# Patient Record
Sex: MALE | Race: WHITE | Employment: OTHER | URBAN - METROPOLITAN AREA
[De-identification: names, ages, dates, MRNs, and addresses within clinical notes are randomized per-mention and may not be internally consistent; named-entity substitution may affect disease eponyms.]

---

## 2017-02-07 ENCOUNTER — TELEPHONE (OUTPATIENT)
Dept: FAMILY MEDICINE CLINIC | Facility: CLINIC | Age: 72
End: 2017-02-07

## 2017-02-07 NOTE — TELEPHONE ENCOUNTER
LMOM for pt to call back and schedule his Medicare Annual Well Visit w/Dr Peter Getting due after 6/10/17

## 2017-02-08 ENCOUNTER — HOSPITAL ENCOUNTER (EMERGENCY)
Facility: HOSPITAL | Age: 72
Discharge: HOME | End: 2017-02-08
Attending: EMERGENCY MEDICINE
Payer: MEDICARE

## 2017-02-08 ENCOUNTER — APPOINTMENT (OUTPATIENT)
Dept: CT IMAGING | Facility: HOSPITAL | Age: 72
End: 2017-02-08
Attending: EMERGENCY MEDICINE
Payer: MEDICARE

## 2017-02-08 VITALS
WEIGHT: 185 LBS | DIASTOLIC BLOOD PRESSURE: 67 MMHG | HEIGHT: 70 IN | HEART RATE: 73 BPM | OXYGEN SATURATION: 99 % | RESPIRATION RATE: 16 BRPM | BODY MASS INDEX: 26.48 KG/M2 | TEMPERATURE: 98 F | SYSTOLIC BLOOD PRESSURE: 111 MMHG

## 2017-02-08 DIAGNOSIS — K57.92 ACUTE DIVERTICULITIS: Primary | ICD-10-CM

## 2017-02-08 LAB
ALBUMIN SERPL-MCNC: 4 G/DL (ref 3.5–4.8)
ALP LIVER SERPL-CCNC: 81 U/L (ref 45–117)
ALT SERPL-CCNC: 26 U/L (ref 17–63)
AST SERPL-CCNC: 18 U/L (ref 15–41)
BASOPHILS # BLD AUTO: 0.06 X10(3) UL (ref 0–0.1)
BASOPHILS NFR BLD AUTO: 0.4 %
BILIRUB SERPL-MCNC: 0.8 MG/DL (ref 0.1–2)
BILIRUB UR QL STRIP.AUTO: NEGATIVE
BUN BLD-MCNC: 18 MG/DL (ref 8–20)
CALCIUM BLD-MCNC: 9 MG/DL (ref 8.3–10.3)
CHLORIDE: 104 MMOL/L (ref 101–111)
CLARITY UR REFRACT.AUTO: CLEAR
CO2: 28 MMOL/L (ref 22–32)
CREAT BLD-MCNC: 1.06 MG/DL (ref 0.7–1.3)
EOSINOPHIL # BLD AUTO: 0.18 X10(3) UL (ref 0–0.3)
EOSINOPHIL NFR BLD AUTO: 1.3 %
ERYTHROCYTE [DISTWIDTH] IN BLOOD BY AUTOMATED COUNT: 12.1 % (ref 11.5–16)
GLUCOSE BLD-MCNC: 122 MG/DL (ref 70–99)
GLUCOSE UR STRIP.AUTO-MCNC: NEGATIVE MG/DL
HCT VFR BLD AUTO: 44.6 % (ref 37–53)
HGB BLD-MCNC: 15.8 G/DL (ref 13–17)
IMMATURE GRANULOCYTE COUNT: 0.07 X10(3) UL (ref 0–1)
IMMATURE GRANULOCYTE RATIO %: 0.5 %
KETONES UR STRIP.AUTO-MCNC: NEGATIVE MG/DL
LEUKOCYTE ESTERASE UR QL STRIP.AUTO: NEGATIVE
LIPASE: 174 U/L (ref 73–393)
LYMPHOCYTES # BLD AUTO: 1.5 X10(3) UL (ref 0.9–4)
LYMPHOCYTES NFR BLD AUTO: 10.9 %
M PROTEIN MFR SERPL ELPH: 7.3 G/DL (ref 6.1–8.3)
MCH RBC QN AUTO: 32.2 PG (ref 27–33.2)
MCHC RBC AUTO-ENTMCNC: 35.4 G/DL (ref 31–37)
MCV RBC AUTO: 90.8 FL (ref 80–99)
MONOCYTES # BLD AUTO: 1.39 X10(3) UL (ref 0.1–0.6)
MONOCYTES NFR BLD AUTO: 10.1 %
NEUTROPHIL ABS PRELIM: 10.53 X10 (3) UL (ref 1.3–6.7)
NEUTROPHILS # BLD AUTO: 10.53 X10(3) UL (ref 1.3–6.7)
NEUTROPHILS NFR BLD AUTO: 76.8 %
NITRITE UR QL STRIP.AUTO: NEGATIVE
PH UR STRIP.AUTO: 6 [PH] (ref 4.5–8)
PLATELET # BLD AUTO: 203 10(3)UL (ref 150–450)
POTASSIUM SERPL-SCNC: 4.2 MMOL/L (ref 3.6–5.1)
PROT UR STRIP.AUTO-MCNC: NEGATIVE MG/DL
RBC # BLD AUTO: 4.91 X10(6)UL (ref 3.8–5.8)
RBC UR QL AUTO: NEGATIVE
RED CELL DISTRIBUTION WIDTH-SD: 40.2 FL (ref 35.1–46.3)
SODIUM SERPL-SCNC: 138 MMOL/L (ref 136–144)
SP GR UR STRIP.AUTO: 1.01 (ref 1–1.03)
UROBILINOGEN UR STRIP.AUTO-MCNC: <2 MG/DL
WBC # BLD AUTO: 13.7 X10(3) UL (ref 4–13)

## 2017-02-08 PROCEDURE — 96367 TX/PROPH/DG ADDL SEQ IV INF: CPT

## 2017-02-08 PROCEDURE — 99284 EMERGENCY DEPT VISIT MOD MDM: CPT

## 2017-02-08 PROCEDURE — 83690 ASSAY OF LIPASE: CPT | Performed by: EMERGENCY MEDICINE

## 2017-02-08 PROCEDURE — 74176 CT ABD & PELVIS W/O CONTRAST: CPT

## 2017-02-08 PROCEDURE — 85025 COMPLETE CBC W/AUTO DIFF WBC: CPT | Performed by: EMERGENCY MEDICINE

## 2017-02-08 PROCEDURE — 80053 COMPREHEN METABOLIC PANEL: CPT | Performed by: EMERGENCY MEDICINE

## 2017-02-08 PROCEDURE — 96365 THER/PROPH/DIAG IV INF INIT: CPT

## 2017-02-08 PROCEDURE — 96361 HYDRATE IV INFUSION ADD-ON: CPT

## 2017-02-08 PROCEDURE — 96375 TX/PRO/DX INJ NEW DRUG ADDON: CPT

## 2017-02-08 PROCEDURE — 81003 URINALYSIS AUTO W/O SCOPE: CPT | Performed by: EMERGENCY MEDICINE

## 2017-02-08 PROCEDURE — 96366 THER/PROPH/DIAG IV INF ADDON: CPT

## 2017-02-08 RX ORDER — KETOROLAC TROMETHAMINE 30 MG/ML
30 INJECTION, SOLUTION INTRAMUSCULAR; INTRAVENOUS ONCE
Status: COMPLETED | OUTPATIENT
Start: 2017-02-08 | End: 2017-02-08

## 2017-02-08 RX ORDER — METRONIDAZOLE 500 MG/100ML
500 INJECTION, SOLUTION INTRAVENOUS ONCE
Status: COMPLETED | OUTPATIENT
Start: 2017-02-08 | End: 2017-02-08

## 2017-02-08 RX ORDER — LEVOFLOXACIN 500 MG/1
500 TABLET, FILM COATED ORAL DAILY
Qty: 10 TABLET | Refills: 0 | Status: SHIPPED | OUTPATIENT
Start: 2017-02-08 | End: 2017-02-18

## 2017-02-08 RX ORDER — SODIUM CHLORIDE 9 MG/ML
INJECTION, SOLUTION INTRAVENOUS ONCE
Status: COMPLETED | OUTPATIENT
Start: 2017-02-08 | End: 2017-02-08

## 2017-02-08 RX ORDER — LEVOFLOXACIN 5 MG/ML
750 INJECTION, SOLUTION INTRAVENOUS ONCE
Status: COMPLETED | OUTPATIENT
Start: 2017-02-08 | End: 2017-02-08

## 2017-02-08 RX ORDER — METRONIDAZOLE 500 MG/1
500 TABLET ORAL 2 TIMES DAILY
Qty: 20 TABLET | Refills: 0 | Status: SHIPPED | OUTPATIENT
Start: 2017-02-08 | End: 2017-02-18

## 2017-02-08 RX ORDER — ONDANSETRON 8 MG/1
8 TABLET, ORALLY DISINTEGRATING ORAL EVERY 6 HOURS PRN
Qty: 10 TABLET | Refills: 0 | Status: SHIPPED | OUTPATIENT
Start: 2017-02-08 | End: 2017-02-13

## 2017-02-08 RX ORDER — TAMSULOSIN HYDROCHLORIDE 0.4 MG/1
0.4 CAPSULE ORAL DAILY
COMMUNITY
End: 2017-02-13

## 2017-02-08 NOTE — PROGRESS NOTES
Pharmacy Note: dose adjustment of levaquin    Eloisa Murry is a 80year old male who has been prescribed Levaquin 500 mg IV x 1 dose in the ER.   CrCl is 67 ml/min so the dose has been adjusted  to Levaquin 750 mg IV x 1 in the ER per hospital renal

## 2017-02-08 NOTE — ED INITIAL ASSESSMENT (HPI)
C/o abd pain, more to the lower quads, onset 24hrs PTA. Pt denies n/v/d, reports urinary urgency, \"not a lot coming out. \" , denies hematuria

## 2017-02-08 NOTE — ED PROVIDER NOTES
Patient Seen in: [facility] Emergency Department    History   Patient presents with:  Abdomen/Flank Pain (GI/)    Stated Complaint: abd pain    HPI    Patient complains of abdominal pain. Abdominal pain started yesterday.   Patient notes that he h Capsule Delayed Release,  Take 1 capsule (40 mg total) by mouth daily. Patient taking differently: Take 40 mg by mouth every other day. Acidophilus/Pectin (PROBIOTIC) Oral Cap,  Take 1 capsule by mouth daily.    EQ FIBER SUPPLEMENT OR,  Take 6 capsules bilaterally. No rhonchi or rales. Heart: Normal S1 and S2, without murmur or rub. Distal pulses are strong and symmetric. Abdomen: Soft, nondistended. Bowel sounds present. Tenderness across the central pelvis.   No tenderness at McBurney's and patien shift on differential.  Metabolic panel unremarkable  Urinalysis shows negative blood, nitrites, leukocytes     CT abdomen shows acute diverticulitis of the sigmoid. This would be consistent with patient's symptoms. No urinary abnormalities noted.   Incid

## 2017-02-13 ENCOUNTER — OFFICE VISIT (OUTPATIENT)
Dept: FAMILY MEDICINE CLINIC | Facility: CLINIC | Age: 72
End: 2017-02-13

## 2017-02-13 VITALS
HEART RATE: 68 BPM | RESPIRATION RATE: 14 BRPM | DIASTOLIC BLOOD PRESSURE: 60 MMHG | SYSTOLIC BLOOD PRESSURE: 100 MMHG | WEIGHT: 185.63 LBS | TEMPERATURE: 98 F | HEIGHT: 69.9 IN | BODY MASS INDEX: 26.57 KG/M2

## 2017-02-13 DIAGNOSIS — K57.32 DIVERTICULITIS OF LARGE INTESTINE WITHOUT PERFORATION OR ABSCESS WITHOUT BLEEDING: Primary | ICD-10-CM

## 2017-02-13 DIAGNOSIS — K21.00 REFLUX ESOPHAGITIS: ICD-10-CM

## 2017-02-13 DIAGNOSIS — R73.9 ELEVATED BLOOD SUGAR: ICD-10-CM

## 2017-02-13 DIAGNOSIS — Z12.5 PROSTATE CANCER SCREENING: ICD-10-CM

## 2017-02-13 DIAGNOSIS — Z13.6 SCREENING FOR CARDIOVASCULAR CONDITION: ICD-10-CM

## 2017-02-13 DIAGNOSIS — I70.0 ABDOMINAL AORTIC ATHEROSCLEROSIS (HCC): ICD-10-CM

## 2017-02-13 PROBLEM — M75.80 ROTATOR CUFF TENDINITIS: Status: ACTIVE | Noted: 2017-02-13

## 2017-02-13 PROCEDURE — 99214 OFFICE O/P EST MOD 30 MIN: CPT | Performed by: FAMILY MEDICINE

## 2017-02-13 RX ORDER — OMEPRAZOLE 40 MG/1
40 CAPSULE, DELAYED RELEASE ORAL DAILY
Qty: 90 CAPSULE | Refills: 3 | Status: SHIPPED | OUTPATIENT
Start: 2017-02-13 | End: 2018-06-19

## 2017-02-13 RX ORDER — TAMSULOSIN HYDROCHLORIDE 0.4 MG/1
0.4 CAPSULE ORAL DAILY
Qty: 90 CAPSULE | Refills: 3 | Status: SHIPPED | OUTPATIENT
Start: 2017-02-13 | End: 2018-06-19

## 2017-02-13 NOTE — PROGRESS NOTES
Patient presents with:  ER F/U: went to ED on 2/8/17 for Diverticulits- pain still present  Medication Request: new pharmacy needs new 90 day scripts- Flomax and Omeprazole  Bloating: feeling alot more full in abdomen since beginning of year    HPI:   Anai - supple, no significant adenopathy  Chest - clear to auscultation, no wheezes, rales or rhonchi, symmetric air entry  Heart - normal rate, regular rhythm, normal S1, S2, no murmurs, rubs, clicks or gallops  Abdomen - soft, tender only just L of midline in mmol/L 138   Potassium      3.6-5.1 mmol/L 4.2   Chloride      101-111 mmol/L 104   Carbon Dioxide, Total      22.0-32.0 mmol/L 28.0   URINE-COLOR      Yellow Straw   APPEARANCE      Clear Clear   SPECIFIC GRAVITY      1.001-1.030 1.011   GLUCOSE (URINE DI

## 2017-06-06 ENCOUNTER — LAB ENCOUNTER (OUTPATIENT)
Dept: LAB | Age: 72
End: 2017-06-06
Attending: FAMILY MEDICINE
Payer: MEDICARE

## 2017-06-06 DIAGNOSIS — Z12.5 PROSTATE CANCER SCREENING: ICD-10-CM

## 2017-06-06 DIAGNOSIS — Z13.6 SCREENING FOR ISCHEMIC HEART DISEASE: Primary | ICD-10-CM

## 2017-06-06 PROCEDURE — 80053 COMPREHEN METABOLIC PANEL: CPT

## 2017-06-06 PROCEDURE — 80061 LIPID PANEL: CPT

## 2017-06-06 PROCEDURE — 36415 COLL VENOUS BLD VENIPUNCTURE: CPT

## 2017-06-07 ENCOUNTER — TELEPHONE (OUTPATIENT)
Dept: FAMILY MEDICINE CLINIC | Facility: CLINIC | Age: 72
End: 2017-06-07

## 2017-06-07 NOTE — TELEPHONE ENCOUNTER
LMOM for pt to either call back or come in 15 prior to answer Annual Health Assessment Form (form by daily checklist)

## 2017-06-13 ENCOUNTER — OFFICE VISIT (OUTPATIENT)
Dept: FAMILY MEDICINE CLINIC | Facility: CLINIC | Age: 72
End: 2017-06-13

## 2017-06-13 VITALS
SYSTOLIC BLOOD PRESSURE: 104 MMHG | HEIGHT: 69.75 IN | DIASTOLIC BLOOD PRESSURE: 62 MMHG | HEART RATE: 80 BPM | TEMPERATURE: 98 F | RESPIRATION RATE: 14 BRPM | WEIGHT: 180 LBS | BODY MASS INDEX: 26.06 KG/M2

## 2017-06-13 DIAGNOSIS — M17.12 ARTHRITIS OF LEFT KNEE: ICD-10-CM

## 2017-06-13 DIAGNOSIS — J30.2 SEASONAL ALLERGIC RHINITIS, UNSPECIFIED ALLERGIC RHINITIS TRIGGER: ICD-10-CM

## 2017-06-13 DIAGNOSIS — Z00.00 ENCOUNTER FOR ANNUAL HEALTH EXAMINATION: Primary | ICD-10-CM

## 2017-06-13 DIAGNOSIS — Z23 NEED FOR VACCINATION: ICD-10-CM

## 2017-06-13 DIAGNOSIS — Z96.641 STATUS POST RIGHT HIP REPLACEMENT: ICD-10-CM

## 2017-06-13 PROCEDURE — G0402 INITIAL PREVENTIVE EXAM: HCPCS | Performed by: FAMILY MEDICINE

## 2017-06-13 PROCEDURE — 90732 PPSV23 VACC 2 YRS+ SUBQ/IM: CPT | Performed by: FAMILY MEDICINE

## 2017-06-13 PROCEDURE — G0009 ADMIN PNEUMOCOCCAL VACCINE: HCPCS | Performed by: FAMILY MEDICINE

## 2017-06-13 NOTE — PATIENT INSTRUCTIONS
Clovis Garcia's SCREENING SCHEDULE   Tests on this list are recommended by your physician but may not be covered, or covered at this frequency, by your insurer. Please check with your insurance carrier before scheduling to verify coverage.     PREV years- more often if abnormal Colonoscopy,5 Years due on 07/01/2021 Update [facility] if applicable    Flex Sigmoidoscopy Screen  Covered every 5 years No results found for this or any previous visit. No flowsheet data found.      Fecal Occult Blood prescription benefits, but Medicare does not cover unless Medically needed    Zoster (Not covered by Medicare Part B) No orders found for this or any previous visit.  This may be covered with your pharmacy  prescription benefits     Recommended Websites for

## 2017-06-13 NOTE — PROGRESS NOTES
HPI:   Lamont Otero is a 80year old male who presents for a Medicare Initial Preventative Physical Exam (Welcome to Medicare- < 12 months on Medicare). Preventative Screening  Colonoscopy - 7/2016. Normal, repeat 5 yrs. Prostate - PSA good. as Taking for the 6/13/17 encounter (Office Visit) with Minda Magallon MD:  Omeprazole 40 MG Oral Capsule Delayed Release Take 1 capsule (40 mg total) by mouth daily.    tamsulosin HCl (FLOMAX) 0.4 MG Oral Cap Take 1 capsule (0.4 mg total) by mouth daily Temp(Src) 97.6 °F (36.4 °C)  Resp 14  Ht 69.75\"  Wt 180 lb  BMI 26.00 kg/m2  Estimated body mass index is 26 kg/(m^2) as calculated from the following:    Height as of this encounter: 69.75\". Weight as of this encounter: 180 lb.     Medicare Hearing As PRSV FREE SINGLE D (80211) FLU CLINIC 09/21/2016   • Influenza 01/14/2013   • Pneumococcal (Prevnar 13) 06/10/2016   • TD 07/17/2008   • TDAP 05/01/2016   • Zoster (Shingles) 09/01/2013       ASSESSMENT AND OTHER RELEVANT CHRONIC CONDITIONS:   Collin Edwards good energy level?: Stretching; Appropriate Exercise    How would you describe your daily physical activity?: Moderate    How would you describe your current health state?: Good    How do you maintain positive mental well-being?: Social Interaction;Games; Vi will?: No     Please go to \"Cognitive Assessment\" under Medicare Assessment section in Charting, test patient and document. Then, refresh your progress note to see your input here.   Cognitive Assessment     What day of the week is this?: Correct    Wh orders found for this or any previous visit. Tetanus No orders found for this or any previous visit.          SPECIFIC DISEASE MONITORING Internal Lab or Procedure External Lab or Procedure   Annual Monitoring of Persistent     Medications (ACE/ARB, dig

## 2017-07-26 ENCOUNTER — TELEPHONE (OUTPATIENT)
Dept: FAMILY MEDICINE CLINIC | Facility: CLINIC | Age: 72
End: 2017-07-26

## 2017-07-26 DIAGNOSIS — Z12.5 SCREENING FOR MALIGNANT NEOPLASM OF PROSTATE: ICD-10-CM

## 2017-07-26 DIAGNOSIS — Z13.6 SCREENING FOR CARDIOVASCULAR CONDITION: Primary | ICD-10-CM

## 2017-07-26 NOTE — TELEPHONE ENCOUNTER
Medicare Annual Well Visit on 6/19/18 and is requesting blood work prior to appt. Will you approve it?  If so enter into [facility] and notify pt

## 2017-07-26 NOTE — TELEPHONE ENCOUNTER
Called patient and left message that orders were entered and to wait until about a week before his appt to have blood drawn, also advised patient of fasting

## 2017-10-24 ENCOUNTER — PATIENT MESSAGE (OUTPATIENT)
Dept: FAMILY MEDICINE CLINIC | Facility: CLINIC | Age: 72
End: 2017-10-24

## 2017-10-24 NOTE — TELEPHONE ENCOUNTER
From: Tiffanie Mata  To: Radha Mccann MD  Sent: 10/24/2017 1:10 PM CDT  Subject: Non-Urgent Medical Question    I have been watching advertisements for the senior flu vaccine. Is this something I should be getting? Are there any negatives?

## 2017-10-26 ENCOUNTER — CHARTING TRANS (OUTPATIENT)
Dept: OTHER | Age: 72
End: 2017-10-26

## 2018-01-23 ENCOUNTER — TELEPHONE (OUTPATIENT)
Dept: FAMILY MEDICINE CLINIC | Facility: CLINIC | Age: 73
End: 2018-01-23

## 2018-01-23 NOTE — TELEPHONE ENCOUNTER
Pt has scheduled his Pre-Op w/Dr Christophe Warren on 2/5/18. Having L Knee Replacement Surgery at [facility] w/Dr Valencia Buchanan. OM for [facility] at [phone] to fax over orders

## 2018-02-05 ENCOUNTER — OFFICE VISIT (OUTPATIENT)
Dept: FAMILY MEDICINE CLINIC | Facility: CLINIC | Age: 73
End: 2018-02-05

## 2018-02-05 VITALS
RESPIRATION RATE: 14 BRPM | WEIGHT: 181.19 LBS | HEIGHT: 69.6 IN | DIASTOLIC BLOOD PRESSURE: 70 MMHG | HEART RATE: 64 BPM | BODY MASS INDEX: 26.23 KG/M2 | TEMPERATURE: 98 F | SYSTOLIC BLOOD PRESSURE: 120 MMHG

## 2018-02-05 DIAGNOSIS — Z01.818 PREOP EXAMINATION: Primary | ICD-10-CM

## 2018-02-05 DIAGNOSIS — K21.00 REFLUX ESOPHAGITIS: ICD-10-CM

## 2018-02-05 DIAGNOSIS — M17.12 PRIMARY OSTEOARTHRITIS OF LEFT KNEE: ICD-10-CM

## 2018-02-05 DIAGNOSIS — Z96.641 STATUS POST RIGHT HIP REPLACEMENT: ICD-10-CM

## 2018-02-05 PROBLEM — M75.80 ROTATOR CUFF TENDINITIS: Status: RESOLVED | Noted: 2017-02-13 | Resolved: 2018-02-05

## 2018-02-05 PROCEDURE — 99214 OFFICE O/P EST MOD 30 MIN: CPT | Performed by: FAMILY MEDICINE

## 2018-02-05 RX ORDER — FERROUS SULFATE 325(65) MG
325 TABLET ORAL DAILY
COMMUNITY
End: 2018-06-06

## 2018-02-05 RX ORDER — GUAIFENESIN 1200 MG/1
1 TABLET, EXTENDED RELEASE ORAL DAILY
COMMUNITY
End: 2018-06-06

## 2018-02-05 RX ORDER — CLOBETASOL PROPIONATE 0.5 MG/G
1 OINTMENT TOPICAL AS NEEDED
COMMUNITY
Start: 2018-01-30 | End: 2019-06-25

## 2018-02-05 NOTE — PROGRESS NOTES
Patient presents with:  Pre-Op Exam: Pre-Op having left knee replacement with Dr Lali Bernabe at [facility] on 2/26/18- had labs done at [facility]- Will stop all meds on  2/19/18 except for Flomax and Omeprazole  Bronchitis: was Dx on a cruise two weeks ago- one day left of current facility-administered medications on file prior to visit.      Allergies:    Norco [Hydrocodone-*    Nausea and vomiting    Past Surgical History:   Procedure Laterality Date   • Appendectomy  1969   • Colonoscopy  2006    prev.2002   • Cortisone in mucosa, and tongue normal; teeth and gums normal  Neck: no adenopathy, no JVD, supple, symmetrical, trachea midline and thyroid not enlarged, symmetric, no tenderness/mass/nodules  Lungs: clear to auscultation bilaterally  Heart: S1, S2 normal, no murmur,

## 2018-02-28 ENCOUNTER — TELEPHONE (OUTPATIENT)
Dept: FAMILY MEDICINE CLINIC | Facility: CLINIC | Age: 73
End: 2018-02-28

## 2018-02-28 NOTE — TELEPHONE ENCOUNTER
I have received DC summary . Apparently uneventful admission for knee surgery. DC summary reviewed    Can we call patient and see how he is feeling now? Curtis she need to see more for follow up in the next few weeks?

## 2018-03-01 NOTE — TELEPHONE ENCOUNTER
TC made to patient to check to see if we could make hospital follow-up appt for patient. Per patient surgeon is following him for now. He is a patient of Dr. Dolores Silva from [facility].  Will come in if necessary but otherwise he is having follow-up appts with Dr. Nina Pang

## 2018-03-01 NOTE — TELEPHONE ENCOUNTER
Front Staff, please assist this patient with scheduling a Hosp F/U appt for knee replacement at [facility], discharged on 2.28.18

## 2018-05-19 ENCOUNTER — HOSPITAL ENCOUNTER (EMERGENCY)
Facility: HOSPITAL | Age: 73
Discharge: HOME | End: 2018-05-19
Attending: EMERGENCY MEDICINE
Payer: MEDICARE

## 2018-05-19 ENCOUNTER — APPOINTMENT (OUTPATIENT)
Dept: CT IMAGING | Facility: HOSPITAL | Age: 73
End: 2018-05-19
Attending: EMERGENCY MEDICINE
Payer: MEDICARE

## 2018-05-19 VITALS
BODY MASS INDEX: 26.66 KG/M2 | SYSTOLIC BLOOD PRESSURE: 126 MMHG | WEIGHT: 180 LBS | OXYGEN SATURATION: 98 % | TEMPERATURE: 98 F | RESPIRATION RATE: 16 BRPM | HEART RATE: 75 BPM | HEIGHT: 69 IN | DIASTOLIC BLOOD PRESSURE: 71 MMHG

## 2018-05-19 DIAGNOSIS — K86.89 PANCREATIC MASS: ICD-10-CM

## 2018-05-19 DIAGNOSIS — K57.32 SIGMOID DIVERTICULITIS: Primary | ICD-10-CM

## 2018-05-19 PROCEDURE — 83690 ASSAY OF LIPASE: CPT | Performed by: EMERGENCY MEDICINE

## 2018-05-19 PROCEDURE — 99284 EMERGENCY DEPT VISIT MOD MDM: CPT

## 2018-05-19 PROCEDURE — 80053 COMPREHEN METABOLIC PANEL: CPT | Performed by: EMERGENCY MEDICINE

## 2018-05-19 PROCEDURE — 36415 COLL VENOUS BLD VENIPUNCTURE: CPT

## 2018-05-19 PROCEDURE — 81003 URINALYSIS AUTO W/O SCOPE: CPT | Performed by: EMERGENCY MEDICINE

## 2018-05-19 PROCEDURE — 85025 COMPLETE CBC W/AUTO DIFF WBC: CPT | Performed by: EMERGENCY MEDICINE

## 2018-05-19 PROCEDURE — 99285 EMERGENCY DEPT VISIT HI MDM: CPT

## 2018-05-19 PROCEDURE — 74177 CT ABD & PELVIS W/CONTRAST: CPT | Performed by: EMERGENCY MEDICINE

## 2018-05-19 RX ORDER — METRONIDAZOLE 500 MG/1
500 TABLET ORAL 3 TIMES DAILY
Qty: 30 TABLET | Refills: 0 | Status: SHIPPED | OUTPATIENT
Start: 2018-05-19 | End: 2018-05-29

## 2018-05-19 RX ORDER — LEVOFLOXACIN 500 MG/1
500 TABLET, FILM COATED ORAL DAILY
Qty: 10 TABLET | Refills: 0 | Status: SHIPPED | OUTPATIENT
Start: 2018-05-19 | End: 2018-05-29

## 2018-05-19 NOTE — ED INITIAL ASSESSMENT (HPI)
Pt ambulatory to er with c.c. Lt low abd pain pt states several yrs ago had diverticulitis, pt states increasing pain over last few hours sharp in nature. Pt states when he sits or lay down he does not experience pain.  No vomiting or diarrhea BM x 2 this A

## 2018-06-06 PROBLEM — Z87.19 HISTORY OF DIVERTICULITIS: Status: ACTIVE | Noted: 2018-06-06

## 2018-06-06 PROBLEM — K86.2 PANCREATIC CYST: Status: ACTIVE | Noted: 2018-06-06

## 2018-06-06 PROBLEM — K86.2 PANCREATIC CYST (HCC): Status: ACTIVE | Noted: 2018-06-06

## 2018-06-12 ENCOUNTER — HOSPITAL ENCOUNTER (OUTPATIENT)
Dept: MRI IMAGING | Age: 73
Discharge: HOME | End: 2018-06-12
Attending: INTERNAL MEDICINE
Payer: MEDICARE

## 2018-06-12 ENCOUNTER — APPOINTMENT (OUTPATIENT)
Dept: LAB | Age: 73
End: 2018-06-12
Attending: FAMILY MEDICINE
Payer: MEDICARE

## 2018-06-12 DIAGNOSIS — Z12.5 SCREENING FOR MALIGNANT NEOPLASM OF PROSTATE: ICD-10-CM

## 2018-06-12 DIAGNOSIS — K86.2 PANCREATIC CYST: ICD-10-CM

## 2018-06-12 PROCEDURE — 74181 MRI ABDOMEN W/O CONTRAST: CPT | Performed by: INTERNAL MEDICINE

## 2018-06-12 PROCEDURE — 76376 3D RENDER W/INTRP POSTPROCES: CPT | Performed by: INTERNAL MEDICINE

## 2018-06-12 PROCEDURE — 80053 COMPREHEN METABOLIC PANEL: CPT | Performed by: FAMILY MEDICINE

## 2018-06-12 PROCEDURE — 80061 LIPID PANEL: CPT | Performed by: FAMILY MEDICINE

## 2018-06-12 PROCEDURE — 36415 COLL VENOUS BLD VENIPUNCTURE: CPT | Performed by: FAMILY MEDICINE

## 2018-06-13 ENCOUNTER — TELEPHONE (OUTPATIENT)
Dept: FAMILY MEDICINE CLINIC | Facility: CLINIC | Age: 73
End: 2018-06-13

## 2018-06-13 NOTE — TELEPHONE ENCOUNTER
YO for patient to contact office and complete annual health forms prior to appointment with Dr. Sharyn Monteiro

## 2018-06-19 ENCOUNTER — OFFICE VISIT (OUTPATIENT)
Dept: FAMILY MEDICINE CLINIC | Facility: CLINIC | Age: 73
End: 2018-06-19

## 2018-06-19 VITALS
DIASTOLIC BLOOD PRESSURE: 68 MMHG | HEIGHT: 69.75 IN | RESPIRATION RATE: 12 BRPM | BODY MASS INDEX: 25.63 KG/M2 | HEART RATE: 64 BPM | WEIGHT: 177 LBS | SYSTOLIC BLOOD PRESSURE: 108 MMHG | TEMPERATURE: 98 F

## 2018-06-19 DIAGNOSIS — Z96.652 HISTORY OF LEFT KNEE REPLACEMENT: ICD-10-CM

## 2018-06-19 DIAGNOSIS — Z00.00 ENCOUNTER FOR ANNUAL HEALTH EXAMINATION: Primary | ICD-10-CM

## 2018-06-19 DIAGNOSIS — K21.00 REFLUX ESOPHAGITIS: ICD-10-CM

## 2018-06-19 DIAGNOSIS — K86.2 PANCREAS CYST: ICD-10-CM

## 2018-06-19 DIAGNOSIS — G56.02 LEFT CARPAL TUNNEL SYNDROME: ICD-10-CM

## 2018-06-19 DIAGNOSIS — K57.30 DIVERTICULOSIS OF LARGE INTESTINE WITHOUT HEMORRHAGE: ICD-10-CM

## 2018-06-19 DIAGNOSIS — Z96.641 STATUS POST RIGHT HIP REPLACEMENT: ICD-10-CM

## 2018-06-19 PROBLEM — M17.12 PRIMARY OSTEOARTHRITIS OF LEFT KNEE: Status: RESOLVED | Noted: 2018-02-05 | Resolved: 2018-06-19

## 2018-06-19 PROCEDURE — G0438 PPPS, INITIAL VISIT: HCPCS | Performed by: FAMILY MEDICINE

## 2018-06-19 RX ORDER — TAMSULOSIN HYDROCHLORIDE 0.4 MG/1
0.4 CAPSULE ORAL DAILY
Qty: 90 CAPSULE | Refills: 3 | Status: SHIPPED
Start: 2018-06-19 | End: 2019-07-01

## 2018-06-19 RX ORDER — OMEPRAZOLE 40 MG/1
40 CAPSULE, DELAYED RELEASE ORAL DAILY
Qty: 90 CAPSULE | Refills: 3 | Status: SHIPPED
Start: 2018-06-19 | End: 2019-06-25

## 2018-06-19 NOTE — TELEPHONE ENCOUNTER
From: Zaria Tucker  Sent: 6/19/2018 12:36 PM CDT  Subject: Medication Renewal Request    Zaria Tucker would like a refill of the following medications:     Omeprazole 40 MG Oral Capsule Delayed Release Jj Pena MD]     tamsulosin

## 2018-06-19 NOTE — PROGRESS NOTES
HPI:   Chau Reyez is a 71year old male who presents for a Medicare Initial Annual Wellness visit (Once after 12 month Medicare anniversary) .     Preventative Screening  Colonoscopy - 7/2016 - due again 5 yrs 2021.  4 bouts of diverticulitis in t standard forms performed Face to Face with patient and Family/surrogate (if present), and forms available to patient in AVS         He has never smoked tobacco.    CAGE Alcohol screening   Archana Morrow was screened for Alcohol abuse and had a score region, unspecified; Diverticulosis of colon (6/29/2012); and Sleep disturbance, unspecified (6/29/2012).     He  has a past surgical history that includes colonoscopy (2006); appendectomy (1969); oral surgery procedure (3/2000); hip replacement surgery; co midline, mucosa normal, no drainage or sinus tenderness   Throat: Lips, mucosa, and tongue normal; teeth and gums normal   Neck: Supple, symmetrical, trachea midline, no adenopathy, thyroid: not enlarged, symmetric, no tenderness/mass/nodules   Back:   Sym biopsy. New since last year. 5. s/p R hip replacement  No isuses    6. h/o L knee TKA  Tolerating well  Recovery is going well. Diet assessment: good     PLAN:  The patient indicates understanding of these issues and agrees to the plan.   R PSA  Annually PSA due on 06/12/2020  Update Health Maintenance if applicable     Immunizations (Update Immunization Activity if applicable)     Influenza  Covered Annually 9/21/2016   Please get every year    Pneumococcal 13 (Prevnar)  Covered Once aft

## 2018-06-19 NOTE — PATIENT INSTRUCTIONS
Alis Garcia's SCREENING SCHEDULE   Tests on this list are recommended by your physician but may not be covered, or covered at this frequency, by your insurer. Please check with your insurance carrier before scheduling to verify coverage.     PREV years- more often if abnormal Colonoscopy,5 Years due on 07/01/2021 Update [facility] if applicable    Flex Sigmoidoscopy Screen  Covered every 5 years No results found for this or any previous visit. No flowsheet data found.      Fecal Occult Blood may be covered with your prescription benefits, but Medicare does not cover unless Medically needed    Zoster (Not covered by Medicare Part B) No orders found for this or any previous visit.  This may be covered with your pharmacy  prescription benefits

## 2018-07-03 ENCOUNTER — HOSPITAL ENCOUNTER (OUTPATIENT)
Facility: HOSPITAL | Age: 73
Setting detail: HOSPITAL OUTPATIENT SURGERY
Discharge: HOME | End: 2018-07-03
Attending: INTERNAL MEDICINE | Admitting: INTERNAL MEDICINE
Payer: MEDICARE

## 2018-07-03 ENCOUNTER — ANESTHESIA (OUTPATIENT)
Dept: ENDOSCOPY | Facility: HOSPITAL | Age: 73
End: 2018-07-03
Payer: MEDICARE

## 2018-07-03 ENCOUNTER — SURGERY (OUTPATIENT)
Age: 73
End: 2018-07-03

## 2018-07-03 ENCOUNTER — ANESTHESIA EVENT (OUTPATIENT)
Dept: ENDOSCOPY | Facility: HOSPITAL | Age: 73
End: 2018-07-03
Payer: MEDICARE

## 2018-07-03 ENCOUNTER — LAB ENCOUNTER (OUTPATIENT)
Dept: LAB | Age: 73
End: 2018-07-03
Attending: INTERNAL MEDICINE
Payer: MEDICARE

## 2018-07-03 VITALS
OXYGEN SATURATION: 100 % | RESPIRATION RATE: 18 BRPM | HEIGHT: 70 IN | TEMPERATURE: 98 F | SYSTOLIC BLOOD PRESSURE: 116 MMHG | WEIGHT: 177 LBS | BODY MASS INDEX: 25.34 KG/M2 | DIASTOLIC BLOOD PRESSURE: 61 MMHG | HEART RATE: 61 BPM

## 2018-07-03 DIAGNOSIS — K86.89 DILATED PANCREATIC DUCT: ICD-10-CM

## 2018-07-03 DIAGNOSIS — K86.2 PANCREAS CYST: ICD-10-CM

## 2018-07-03 DIAGNOSIS — Z00.00 ROUTINE GENERAL MEDICAL EXAMINATION AT A HEALTH CARE FACILITY: Primary | ICD-10-CM

## 2018-07-03 PROCEDURE — 0F9G8ZX DRAINAGE OF PANCREAS, VIA NATURAL OR ARTIFICIAL OPENING ENDOSCOPIC, DIAGNOSTIC: ICD-10-PCS | Performed by: INTERNAL MEDICINE

## 2018-07-03 PROCEDURE — 82378 CARCINOEMBRYONIC ANTIGEN: CPT

## 2018-07-03 PROCEDURE — 82150 ASSAY OF AMYLASE: CPT

## 2018-07-03 RX ORDER — SODIUM CHLORIDE, SODIUM LACTATE, POTASSIUM CHLORIDE, CALCIUM CHLORIDE 600; 310; 30; 20 MG/100ML; MG/100ML; MG/100ML; MG/100ML
INJECTION, SOLUTION INTRAVENOUS CONTINUOUS
Status: DISCONTINUED | OUTPATIENT
Start: 2018-07-03 | End: 2018-07-03

## 2018-07-03 RX ORDER — ONDANSETRON 2 MG/ML
4 INJECTION INTRAMUSCULAR; INTRAVENOUS AS NEEDED
Status: DISCONTINUED | OUTPATIENT
Start: 2018-07-03 | End: 2018-07-03

## 2018-07-03 RX ORDER — NALOXONE HYDROCHLORIDE 0.4 MG/ML
80 INJECTION, SOLUTION INTRAMUSCULAR; INTRAVENOUS; SUBCUTANEOUS AS NEEDED
Status: DISCONTINUED | OUTPATIENT
Start: 2018-07-03 | End: 2018-07-03

## 2018-07-03 NOTE — OPERATIVE REPORT
OPERATIVE REPORT   PATIENT NAME: Chiqui Corcoran  MRN: [id number]  DATE OF OPERATION: 7/3/2018  PREOPERATIVE DIAGNOSIS:   1.  Pancreatic neck cyst with upstream pancreatic ductal dilation found incidentally on CT scan done as part of evaluation for diver scope was then removed. IMPRESSION:  1. Pancreatic neck cyst, with upstream pancreatic ductal dilation without obvious solid component. 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 Await final fluid analysis results  2.  Clear liquid di

## 2018-07-03 NOTE — ANESTHESIA PREPROCEDURE EVALUATION
PRE-OP EVALUATION    Patient Name: Sydnee Funes    Pre-op Diagnosis: Pancreas cyst [K86.2]  Dilated pancreatic duct [K86.89]    Procedure(s):  ENDOSCOPIC ULTRASOUND WITH FINE NEEDLE ASPIRATION    Surgeon(s) and Role:     Urban Anderson MD - Ketty Hickman esophagitis     H/O colonoscopy with polypectomy     s/p R hip replacement     Pancreatic cyst     History of diverticulitis     h/o L knee TKA          Past Surgical History:  1969: APPENDECTOMY  2006: COLONOSCOPY      Comment: prev.2002 2/1/2017: Umesh Gutierrez

## 2018-07-03 NOTE — ANESTHESIA POSTPROCEDURE EVALUATION
[facility] Patient Status:  Hospital Outpatient Surgery   Age/Gender 71year old male MRN [id number]   [facility] ENDOSCOPY Attending Jermaine Harris MD   Hosp Day # 0 PCP Todd Frazier MD       Anesthesia Post

## 2018-07-06 ENCOUNTER — HOSPITAL ENCOUNTER (EMERGENCY)
Facility: HOSPITAL | Age: 73
Discharge: HOME | End: 2018-07-06
Attending: EMERGENCY MEDICINE
Payer: MEDICARE

## 2018-07-06 VITALS
TEMPERATURE: 98 F | SYSTOLIC BLOOD PRESSURE: 140 MMHG | HEIGHT: 71 IN | RESPIRATION RATE: 19 BRPM | HEART RATE: 66 BPM | OXYGEN SATURATION: 99 % | DIASTOLIC BLOOD PRESSURE: 75 MMHG | BODY MASS INDEX: 24.78 KG/M2 | WEIGHT: 177 LBS

## 2018-07-06 DIAGNOSIS — H61.91 SKIN LESION OF RIGHT EAR: ICD-10-CM

## 2018-07-06 DIAGNOSIS — K57.92 ACUTE DIVERTICULITIS: Primary | ICD-10-CM

## 2018-07-06 LAB
ALBUMIN SERPL-MCNC: 3.7 G/DL (ref 3.5–4.8)
ALP LIVER SERPL-CCNC: 71 U/L (ref 45–117)
ALT SERPL-CCNC: 26 U/L (ref 17–63)
AST SERPL-CCNC: 21 U/L (ref 15–41)
BASOPHILS # BLD AUTO: 0.04 X10(3) UL (ref 0–0.1)
BASOPHILS NFR BLD AUTO: 0.3 %
BILIRUB SERPL-MCNC: 0.6 MG/DL (ref 0.1–2)
BUN BLD-MCNC: 18 MG/DL (ref 8–20)
CALCIUM BLD-MCNC: 8.9 MG/DL (ref 8.3–10.3)
CHLORIDE: 107 MMOL/L (ref 101–111)
CO2: 25 MMOL/L (ref 22–32)
CREAT BLD-MCNC: 0.87 MG/DL (ref 0.7–1.3)
EOSINOPHIL # BLD AUTO: 0.2 X10(3) UL (ref 0–0.3)
EOSINOPHIL NFR BLD AUTO: 1.6 %
ERYTHROCYTE [DISTWIDTH] IN BLOOD BY AUTOMATED COUNT: 13 % (ref 11.5–16)
GLUCOSE BLD-MCNC: 122 MG/DL (ref 70–99)
HCT VFR BLD AUTO: 41.6 % (ref 37–53)
HGB BLD-MCNC: 13.9 G/DL (ref 13–17)
IMMATURE GRANULOCYTE COUNT: 0.04 X10(3) UL (ref 0–1)
IMMATURE GRANULOCYTE RATIO %: 0.3 %
LIPASE: 153 U/L (ref 73–393)
LYMPHOCYTES # BLD AUTO: 1.34 X10(3) UL (ref 0.9–4)
LYMPHOCYTES NFR BLD AUTO: 11 %
M PROTEIN MFR SERPL ELPH: 6.7 G/DL (ref 6.1–8.3)
MCH RBC QN AUTO: 30.6 PG (ref 27–33.2)
MCHC RBC AUTO-ENTMCNC: 33.4 G/DL (ref 31–37)
MCV RBC AUTO: 91.6 FL (ref 80–99)
MONOCYTES # BLD AUTO: 1.03 X10(3) UL (ref 0.1–1)
MONOCYTES NFR BLD AUTO: 8.4 %
NEUTROPHIL ABS PRELIM: 9.57 X10 (3) UL (ref 1.3–6.7)
NEUTROPHILS # BLD AUTO: 9.57 X10(3) UL (ref 1.3–6.7)
NEUTROPHILS NFR BLD AUTO: 78.4 %
PLATELET # BLD AUTO: 205 10(3)UL (ref 150–450)
POTASSIUM SERPL-SCNC: 4.2 MMOL/L (ref 3.6–5.1)
RBC # BLD AUTO: 4.54 X10(6)UL (ref 3.8–5.8)
RED CELL DISTRIBUTION WIDTH-SD: 43.6 FL (ref 35.1–46.3)
SODIUM SERPL-SCNC: 141 MMOL/L (ref 136–144)
WBC # BLD AUTO: 12.2 X10(3) UL (ref 4–13)

## 2018-07-06 PROCEDURE — 80053 COMPREHEN METABOLIC PANEL: CPT | Performed by: EMERGENCY MEDICINE

## 2018-07-06 PROCEDURE — 99283 EMERGENCY DEPT VISIT LOW MDM: CPT

## 2018-07-06 PROCEDURE — 85025 COMPLETE CBC W/AUTO DIFF WBC: CPT | Performed by: EMERGENCY MEDICINE

## 2018-07-06 PROCEDURE — 83690 ASSAY OF LIPASE: CPT | Performed by: EMERGENCY MEDICINE

## 2018-07-06 PROCEDURE — 36415 COLL VENOUS BLD VENIPUNCTURE: CPT

## 2018-07-06 RX ORDER — LEVOFLOXACIN 750 MG/1
750 TABLET ORAL ONCE
Status: COMPLETED | OUTPATIENT
Start: 2018-07-06 | End: 2018-07-06

## 2018-07-06 RX ORDER — METRONIDAZOLE 500 MG/1
500 TABLET ORAL 3 TIMES DAILY
Qty: 30 TABLET | Refills: 0 | Status: SHIPPED | OUTPATIENT
Start: 2018-07-06 | End: 2018-07-16

## 2018-07-06 RX ORDER — LEVOFLOXACIN 750 MG/1
750 TABLET ORAL DAILY
Qty: 10 TABLET | Refills: 0 | Status: SHIPPED | OUTPATIENT
Start: 2018-07-06 | End: 2018-07-16

## 2018-07-06 RX ORDER — METRONIDAZOLE 500 MG/1
500 TABLET ORAL ONCE
Status: COMPLETED | OUTPATIENT
Start: 2018-07-06 | End: 2018-07-06

## 2018-07-06 NOTE — ED INITIAL ASSESSMENT (HPI)
PT here 5/19/18 for diverticulitis for third time. At 2030 last night, PT noticed similar symptoms of pain in LLQ, small soft BM's.

## 2018-07-06 NOTE — ED PROVIDER NOTES
Patient Seen in: [facility] Emergency Department    History   Patient presents with:  Abdomen/Flank Pain (GI/)    Stated Complaint: suspected diverticulitis    HPI    he presents to the emergency department with left lower quadrant abdominal pain (36.9 °C)  Temp src: Oral  SpO2: 98 %  O2 Device: None (Room air)    Current:/70   Pulse 71   Temp 98.4 °F (36.9 °C) (Oral)   Resp 12   Ht 180.3 cm (5' 11\")   Wt 80.3 kg   SpO2 98%   BMI 24.69 kg/m²         Physical Exam    Very pleasant older man l not surprising, patient will be discharged with Levaquin and Flagyl. Patient does have a history of a tendon rupture on his right bicep. We discussed other antibiotic regimens.   He states that he really does not believe he could tolerate Augmentin and wo

## 2018-09-07 ENCOUNTER — TELEPHONE (OUTPATIENT)
Dept: FAMILY MEDICINE CLINIC | Facility: CLINIC | Age: 73
End: 2018-09-07

## 2018-09-07 DIAGNOSIS — Z01.818 PRE-OP TESTING: Primary | ICD-10-CM

## 2018-09-08 NOTE — TELEPHONE ENCOUNTER
See [facility] notes for [address] w/ surgeon, however no pre-op orders noted  Pt has pre-op appt scheduled with Dr Yaakov Chaparro on 10/30/18

## 2018-09-10 NOTE — TELEPHONE ENCOUNTER
He needs EKG done order placed at [facility] for patient to get done called patient LMOM to call back to inform him about pending EKG at the hospital

## 2018-09-21 PROBLEM — H52.13 MYOPIA OF BOTH EYES WITH ASTIGMATISM: Status: ACTIVE | Noted: 2018-09-21

## 2018-09-21 PROBLEM — H52.203 MYOPIA OF BOTH EYES WITH ASTIGMATISM: Status: ACTIVE | Noted: 2018-09-21

## 2018-09-21 PROBLEM — H25.813 COMBINED FORMS OF AGE-RELATED CATARACT, BILATERAL: Status: ACTIVE | Noted: 2018-09-21

## 2018-10-25 VITALS — WEIGHT: 177 LBS | BODY MASS INDEX: 25 KG/M2

## 2018-10-29 ENCOUNTER — HOSPITAL ENCOUNTER (OUTPATIENT)
Dept: MRI IMAGING | Facility: HOSPITAL | Age: 73
Discharge: HOME | End: 2018-10-29
Attending: NURSE PRACTITIONER
Payer: MEDICARE

## 2018-10-29 DIAGNOSIS — K86.2 PANCREAS CYST: ICD-10-CM

## 2018-10-29 PROCEDURE — 74183 MRI ABD W/O CNTR FLWD CNTR: CPT | Performed by: NURSE PRACTITIONER

## 2018-10-29 PROCEDURE — A9575 INJ GADOTERATE MEGLUMI 0.1ML: HCPCS | Performed by: NURSE PRACTITIONER

## 2018-10-30 ENCOUNTER — APPOINTMENT (OUTPATIENT)
Dept: LAB | Facility: HOSPITAL | Age: 73
End: 2018-10-30
Attending: FAMILY MEDICINE
Payer: MEDICARE

## 2018-10-30 ENCOUNTER — OFFICE VISIT (OUTPATIENT)
Dept: FAMILY MEDICINE CLINIC | Facility: CLINIC | Age: 73
End: 2018-10-30
Payer: MEDICARE

## 2018-10-30 VITALS
RESPIRATION RATE: 16 BRPM | DIASTOLIC BLOOD PRESSURE: 64 MMHG | HEART RATE: 62 BPM | SYSTOLIC BLOOD PRESSURE: 112 MMHG | HEIGHT: 70 IN | WEIGHT: 181 LBS | TEMPERATURE: 98 F | BODY MASS INDEX: 25.91 KG/M2

## 2018-10-30 DIAGNOSIS — K21.00 REFLUX ESOPHAGITIS: ICD-10-CM

## 2018-10-30 DIAGNOSIS — K86.2 PANCREAS CYST: ICD-10-CM

## 2018-10-30 DIAGNOSIS — Z96.641 STATUS POST RIGHT HIP REPLACEMENT: ICD-10-CM

## 2018-10-30 DIAGNOSIS — Z01.818 PRE-OP TESTING: ICD-10-CM

## 2018-10-30 DIAGNOSIS — Z01.818 PREOP EXAMINATION: Primary | ICD-10-CM

## 2018-10-30 DIAGNOSIS — Z96.652 HISTORY OF LEFT KNEE REPLACEMENT: ICD-10-CM

## 2018-10-30 DIAGNOSIS — G56.03 BILATERAL CARPAL TUNNEL SYNDROME: ICD-10-CM

## 2018-10-30 PROCEDURE — 99214 OFFICE O/P EST MOD 30 MIN: CPT | Performed by: FAMILY MEDICINE

## 2018-10-30 PROCEDURE — 93005 ELECTROCARDIOGRAM TRACING: CPT

## 2018-10-30 PROCEDURE — 93010 ELECTROCARDIOGRAM REPORT: CPT | Performed by: INTERNAL MEDICINE

## 2018-10-30 NOTE — PROGRESS NOTES
Patient presents with:  Pre-Op Exam: On 11- left carpal tunnel and on 11- right carpal tunnel   Eye Problem: bump on right eye, not painul, noticed yesterday     HPI:   Ellie Meka is a 68year old male who is being evaluated for Clobetasol Propionate 0.05 % External Ointment Apply 1 Application topically as needed. Disp:  Rfl:      No current facility-administered medications on file prior to visit.      Allergies:    Norco [Hydrocodone-*    NAUSEA AND VOMITING    Past Surgical Asked        Hobby Hazards: Not Asked        Sleep Concern: Not Asked        Stress Concern: Not Asked        Weight Concern: Not Asked        Special Diet: Not Asked        Back Care: Not Asked        Exercise: Yes          stationary bike (winter), bikes candidate  No ongoing medical conditions  Hold ASA and supplements the week before  No NSAIDs  EKG preop pending. Provided this is normal, he is medically cleared.      2. Bilateral carpal tunnel syndrome  Ongoing hand numbness  Need surgical intervention

## 2019-02-05 ENCOUNTER — HOSPITAL ENCOUNTER (OUTPATIENT)
Dept: CARDIOLOGY CLINIC | Facility: HOSPITAL | Age: 74
Discharge: HOME | End: 2019-02-05
Attending: INTERNAL MEDICINE

## 2019-02-05 DIAGNOSIS — Z13.9 SCREENING FOR CONDITION: ICD-10-CM

## 2019-03-12 ENCOUNTER — HOSPITAL ENCOUNTER (OUTPATIENT)
Dept: CT IMAGING | Facility: HOSPITAL | Age: 74
Discharge: HOME | End: 2019-03-12
Attending: FAMILY MEDICINE

## 2019-03-12 DIAGNOSIS — Z13.9 ENCOUNTER FOR SCREENING: ICD-10-CM

## 2019-03-15 ENCOUNTER — TELEPHONE (OUTPATIENT)
Dept: FAMILY MEDICINE CLINIC | Facility: CLINIC | Age: 74
End: 2019-03-15

## 2019-04-04 ENCOUNTER — TELEPHONE (OUTPATIENT)
Dept: FAMILY MEDICINE CLINIC | Facility: CLINIC | Age: 74
End: 2019-04-04

## 2019-04-04 NOTE — TELEPHONE ENCOUNTER
Cha Steven is calling from [facility] with an Incidental finding 4.36 dilated thoracic aorta. on 3/12/19 can Dr. Lisa Lin just needs to confirm he is informed of the finding.

## 2019-06-19 ENCOUNTER — TELEPHONE (OUTPATIENT)
Dept: FAMILY MEDICINE CLINIC | Facility: CLINIC | Age: 74
End: 2019-06-19

## 2019-06-21 ENCOUNTER — APPOINTMENT (OUTPATIENT)
Dept: LAB | Age: 74
End: 2019-06-21
Attending: FAMILY MEDICINE
Payer: MEDICARE

## 2019-06-21 DIAGNOSIS — Z00.00 ROUTINE GENERAL MEDICAL EXAMINATION AT A HEALTH CARE FACILITY: ICD-10-CM

## 2019-06-21 DIAGNOSIS — Z12.5 SCREENING FOR PROSTATE CANCER: ICD-10-CM

## 2019-06-21 LAB — COMPLEXED PSA SERPL-MCNC: 2.46 NG/ML (ref ?–4)

## 2019-06-21 PROCEDURE — 36415 COLL VENOUS BLD VENIPUNCTURE: CPT | Performed by: FAMILY MEDICINE

## 2019-06-25 ENCOUNTER — OFFICE VISIT (OUTPATIENT)
Dept: FAMILY MEDICINE CLINIC | Facility: CLINIC | Age: 74
End: 2019-06-25
Payer: MEDICARE

## 2019-06-25 VITALS
WEIGHT: 181 LBS | TEMPERATURE: 97 F | SYSTOLIC BLOOD PRESSURE: 122 MMHG | DIASTOLIC BLOOD PRESSURE: 54 MMHG | HEIGHT: 69.5 IN | BODY MASS INDEX: 26.21 KG/M2 | HEART RATE: 68 BPM | RESPIRATION RATE: 16 BRPM

## 2019-06-25 DIAGNOSIS — K86.2 PANCREATIC CYST: ICD-10-CM

## 2019-06-25 DIAGNOSIS — K21.00 REFLUX ESOPHAGITIS: ICD-10-CM

## 2019-06-25 DIAGNOSIS — Z00.00 ENCOUNTER FOR ANNUAL HEALTH EXAMINATION: Primary | ICD-10-CM

## 2019-06-25 DIAGNOSIS — Z13.31 DEPRESSION SCREENING: ICD-10-CM

## 2019-06-25 PROCEDURE — G0439 PPPS, SUBSEQ VISIT: HCPCS | Performed by: FAMILY MEDICINE

## 2019-06-25 PROCEDURE — G0444 DEPRESSION SCREEN ANNUAL: HCPCS | Performed by: FAMILY MEDICINE

## 2019-06-25 RX ORDER — CLOBETASOL PROPIONATE 0.5 MG/G
1 CREAM TOPICAL AS NEEDED
COMMUNITY
End: 2021-11-09 | Stop reason: ALTCHOICE

## 2019-06-25 RX ORDER — COVID-19 ANTIGEN TEST
220 KIT MISCELLANEOUS EVERY 6 HOURS PRN
Status: ON HOLD | COMMUNITY
End: 2020-01-09

## 2019-06-25 RX ORDER — MULTIVITAMIN
1 TABLET ORAL DAILY
COMMUNITY

## 2019-06-25 NOTE — PATIENT INSTRUCTIONS
Kay Sterling Jr.'s SCREENING SCHEDULE   Tests on this list are recommended by your physician but may not be covered, or covered at this frequency, by your insurer. Please check with your insurance carrier before scheduling to verify coverage. more often if abnormal Colonoscopy due on 07/01/2021 Update Health Maintenance if applicable    Flex Sigmoidoscopy Screen  Covered every 5 years No results found for this or any previous visit. No flowsheet data found.      Fecal Occult Blood   Covered Melania with your prescription benefits, but Medicare does not cover unless Medically needed    Zoster (Not covered by Medicare Part B) No orders found for this or any previous visit.  This may be covered with your pharmacy  prescription benefits     Recommended We

## 2019-06-25 NOTE — PROGRESS NOTES
HPI:   Muna Vega. is a 68year old male who presents for a Medicare Subsequent Annual Wellness visit (Pt already had Initial Annual Wellness). Preventative Screening  Colonoscopy - 7/2016. Repeat 5 yrs. Prostate - normal stable PSA. in [facility] [address], and patient is instructed to get our office a copy of it for scanning into Epic. He has never smoked tobacco.    CAGE Alcohol screening   Muna Pulido was screened for Alcohol abuse and had a score of 0 so is at low risk. past medical history of Disorders of bursae and tendons in shoulder region, unspecified, Diverticulosis of colon (6/29/2012), PONV (postoperative nausea and vomiting), and Sleep disturbance, unspecified (6/29/2012).     He  has a past surgical history that appears stated age   Head:  Normocephalic, without obvious abnormality, atraumatic   Eyes:  PERRL, conjunctiva/corneas clear, EOM's intact   Nose: Nares normal, septum midline, mucosa normal, no drainage or sinus tenderness   Throat: Lips, mucosa, and tong omeprazole  PRN zantac, Tums         Diet assessment: good     PLAN:  The patient indicates understanding of these issues and agrees to the plan. Reinforced healthy diet, lifestyle, and exercise. No follow-ups on file.      Pam Vaughn MD, 6/25/201 9/28/2018   Please get every year    Pneumococcal 13 (Prevnar)  Covered Once after 65 06/10/2016 Please get once after your 65th birthday    Pneumococcal 23 (Pneumovax)  Covered Once after 65 06/13/2017 Please get once after your 65th birthday    Hepatitis

## 2019-06-26 ENCOUNTER — HOSPITAL ENCOUNTER (EMERGENCY)
Facility: HOSPITAL | Age: 74
Discharge: HOME | End: 2019-06-26
Attending: EMERGENCY MEDICINE
Payer: MEDICARE

## 2019-06-26 ENCOUNTER — APPOINTMENT (OUTPATIENT)
Dept: CT IMAGING | Facility: HOSPITAL | Age: 74
End: 2019-06-26
Attending: EMERGENCY MEDICINE
Payer: MEDICARE

## 2019-06-26 VITALS
TEMPERATURE: 98 F | WEIGHT: 180 LBS | HEIGHT: 69 IN | DIASTOLIC BLOOD PRESSURE: 61 MMHG | RESPIRATION RATE: 16 BRPM | BODY MASS INDEX: 26.66 KG/M2 | HEART RATE: 75 BPM | SYSTOLIC BLOOD PRESSURE: 134 MMHG | OXYGEN SATURATION: 99 %

## 2019-06-26 DIAGNOSIS — K57.92 ACUTE DIVERTICULITIS: Primary | ICD-10-CM

## 2019-06-26 PROCEDURE — 96361 HYDRATE IV INFUSION ADD-ON: CPT

## 2019-06-26 PROCEDURE — 85025 COMPLETE CBC W/AUTO DIFF WBC: CPT | Performed by: EMERGENCY MEDICINE

## 2019-06-26 PROCEDURE — 96365 THER/PROPH/DIAG IV INF INIT: CPT

## 2019-06-26 PROCEDURE — 81003 URINALYSIS AUTO W/O SCOPE: CPT | Performed by: EMERGENCY MEDICINE

## 2019-06-26 PROCEDURE — 96375 TX/PRO/DX INJ NEW DRUG ADDON: CPT

## 2019-06-26 PROCEDURE — 74177 CT ABD & PELVIS W/CONTRAST: CPT | Performed by: EMERGENCY MEDICINE

## 2019-06-26 PROCEDURE — 99284 EMERGENCY DEPT VISIT MOD MDM: CPT

## 2019-06-26 PROCEDURE — 99285 EMERGENCY DEPT VISIT HI MDM: CPT

## 2019-06-26 RX ORDER — MORPHINE SULFATE 4 MG/ML
4 INJECTION, SOLUTION INTRAMUSCULAR; INTRAVENOUS ONCE
Status: COMPLETED | OUTPATIENT
Start: 2019-06-26 | End: 2019-06-26

## 2019-06-26 RX ORDER — TRAMADOL HYDROCHLORIDE 50 MG/1
50 TABLET ORAL EVERY 6 HOURS PRN
Qty: 20 TABLET | Refills: 0 | Status: SHIPPED | OUTPATIENT
Start: 2019-06-26 | End: 2019-07-03

## 2019-06-26 RX ORDER — AMOXICILLIN AND CLAVULANATE POTASSIUM 875; 125 MG/1; MG/1
1 TABLET, FILM COATED ORAL 2 TIMES DAILY
Qty: 20 TABLET | Refills: 0 | Status: SHIPPED | OUTPATIENT
Start: 2019-06-26 | End: 2019-07-06

## 2019-06-26 RX ORDER — ONDANSETRON 2 MG/ML
4 INJECTION INTRAMUSCULAR; INTRAVENOUS ONCE
Status: COMPLETED | OUTPATIENT
Start: 2019-06-26 | End: 2019-06-26

## 2019-06-26 NOTE — ED PROVIDER NOTES
Patient Seen in: [facility] Emergency Department    History   Patient presents with:  Abdomen/Flank Pain (GI/)    Stated Complaint: pt c/o of lower abd pain     HPI    Patient is a 59-year-old male with 4 prior episodes of diverticulitis who prese No      Review of Systems    Positive for stated complaint: pt c/o of lower abd pain   Other systems are as noted in HPI. Constitutional and vital signs reviewed. All other systems reviewed and negative except as noted above.     Physical Exam     ED Abnormality         Status                     ---------                               -----------         ------                     CBC W/ DIFFERENTIAL[[id number]]          Abnormal            Final result                 Please view results for these days.  John Flin tablet Refills: 0

## 2019-06-26 NOTE — ED INITIAL ASSESSMENT (HPI)
Pt reports abdominal bloating last noc before bed, this morning with mid/lower abd paiin radiating down toward penis. Denies vomiting/diarrhea.

## 2019-06-27 ENCOUNTER — TELEPHONE (OUTPATIENT)
Dept: FAMILY MEDICINE CLINIC | Facility: CLINIC | Age: 74
End: 2019-06-27

## 2019-06-27 NOTE — TELEPHONE ENCOUNTER
Pain is not in a typical location. Around umbilicus and to tip of penis.    Unusual, but not unheard of.  CT scan showed no oddities  UA normal  Continue diverticulitis treatment  Might consider seeing surgeon and letting them known about the recurrent iss

## 2019-06-27 NOTE — TELEPHONE ENCOUNTER
Spoke with patient. He states this is his 5th episode of diverticulitis. He states that although the ABX has provided some improvement the pace of his recovery is much slower than he is accustomed to.   He also concerned that the most intense pain origina

## 2019-07-02 RX ORDER — TAMSULOSIN HYDROCHLORIDE 0.4 MG/1
CAPSULE ORAL
Qty: 90 CAPSULE | Refills: 3 | Status: ON HOLD | OUTPATIENT
Start: 2019-07-02 | End: 2020-01-08

## 2019-07-02 NOTE — TELEPHONE ENCOUNTER
Requested Prescriptions     Pending Prescriptions Disp Refills   • TAMSULOSIN HCL 0.4 MG Oral Cap [Pharmacy Med Name: TAMSULOSIN 0.4MG CAPSULES] 90 capsule 0     Sig: TAKE 1 CAPSULE(0.4 MG) BY MOUTH DAILY     LOV 6/25/2019     Patient was asked to follow-u

## 2019-08-28 DIAGNOSIS — K21.00 REFLUX ESOPHAGITIS: ICD-10-CM

## 2019-08-30 RX ORDER — OMEPRAZOLE 40 MG/1
CAPSULE, DELAYED RELEASE ORAL
Qty: 90 CAPSULE | Refills: 1 | Status: SHIPPED | OUTPATIENT
Start: 2019-08-30 | End: 2019-10-25

## 2019-10-11 ENCOUNTER — HOSPITAL ENCOUNTER (OUTPATIENT)
Dept: MRI IMAGING | Facility: HOSPITAL | Age: 74
Discharge: HOME | End: 2019-10-11
Attending: INTERNAL MEDICINE
Payer: MEDICARE

## 2019-10-11 DIAGNOSIS — K86.2 PANCREAS CYST: ICD-10-CM

## 2019-10-11 PROCEDURE — A9575 INJ GADOTERATE MEGLUMI 0.1ML: HCPCS

## 2019-10-11 PROCEDURE — 76376 3D RENDER W/INTRP POSTPROCES: CPT | Performed by: INTERNAL MEDICINE

## 2019-10-11 PROCEDURE — 74183 MRI ABD W/O CNTR FLWD CNTR: CPT | Performed by: INTERNAL MEDICINE

## 2019-10-12 NOTE — PROGRESS NOTES
Reported to be stable on one year follow up. Will review images with RA  Assessment:  1. 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  Stable on revi

## 2019-10-22 ENCOUNTER — TELEPHONE (OUTPATIENT)
Dept: FAMILY MEDICINE CLINIC | Facility: CLINIC | Age: 74
End: 2019-10-22

## 2019-10-22 NOTE — TELEPHONE ENCOUNTER
Received fax from [facility], patient having cataract surgery 12/04/19 with Dr. Michael Downing, H&P required.  Patient scheduled with Dr. Michelle Khan 11/12/19

## 2019-10-22 NOTE — PROGRESS NOTES
Cyst reported to be stable. Please schedule a follow up office visit with Dr Wendy Birmingham to review MRI images and discuss management options.      OV 10/25

## 2019-11-12 ENCOUNTER — OFFICE VISIT (OUTPATIENT)
Dept: FAMILY MEDICINE CLINIC | Facility: CLINIC | Age: 74
End: 2019-11-12
Payer: MEDICARE

## 2019-11-12 VITALS
TEMPERATURE: 99 F | HEIGHT: 69.5 IN | BODY MASS INDEX: 25.86 KG/M2 | WEIGHT: 178.63 LBS | DIASTOLIC BLOOD PRESSURE: 74 MMHG | HEART RATE: 72 BPM | RESPIRATION RATE: 16 BRPM | SYSTOLIC BLOOD PRESSURE: 116 MMHG

## 2019-11-12 DIAGNOSIS — Z01.818 PREOP EXAMINATION: Primary | ICD-10-CM

## 2019-11-12 DIAGNOSIS — K57.30 DIVERTICULOSIS OF LARGE INTESTINE WITHOUT HEMORRHAGE: ICD-10-CM

## 2019-11-12 DIAGNOSIS — Z96.641 STATUS POST RIGHT HIP REPLACEMENT: ICD-10-CM

## 2019-11-12 DIAGNOSIS — K86.2 PANCREATIC CYST: ICD-10-CM

## 2019-11-12 DIAGNOSIS — H26.9 CATARACT OF RIGHT EYE, UNSPECIFIED CATARACT TYPE: ICD-10-CM

## 2019-11-12 DIAGNOSIS — Z96.652 HISTORY OF LEFT KNEE REPLACEMENT: ICD-10-CM

## 2019-11-12 PROCEDURE — 99214 OFFICE O/P EST MOD 30 MIN: CPT | Performed by: FAMILY MEDICINE

## 2019-11-12 RX ORDER — OFLOXACIN 3 MG/ML
SOLUTION/ DROPS OPHTHALMIC
Refills: 1 | COMMUNITY
Start: 2019-10-30 | End: 2019-12-25

## 2019-11-12 RX ORDER — PREDNISOLONE ACETATE 10 MG/ML
SUSPENSION/ DROPS OPHTHALMIC
Refills: 1 | COMMUNITY
Start: 2019-10-30 | End: 2019-12-25

## 2019-11-12 RX ORDER — BROMFENAC SODIUM 0.7 MG/ML
SOLUTION/ DROPS OPHTHALMIC
Refills: 1 | COMMUNITY
Start: 2019-10-30 | End: 2019-12-25

## 2019-11-12 NOTE — PROGRESS NOTES
Patient presents with:  Pre-Op Exam: R cataract surgery by  12/4/19 at [facility] Surgery at [place] [address] [place] in [place].     HPI:   Doreen Faulkner. is a 69year old male who is being evaluated for pre-surgical clearance at  1  ofloxacin 0.3 % Ophthalmic Solution, INSTILL 1 DROP INTO THE RIGHT EYE TID, Disp: , Rfl: 1  prednisoLONE acetate 1 % Ophthalmic Suspension, INSTILL 1 DROP INTO THE RIGHT EYE TID, Disp: , Rfl: 1    No current facility-administered medications on file armond Mucosa normal. No drainage or sinus tenderness.   Throat: lips, mucosa, and tongue normal; teeth and gums normal  Neck: no adenopathy, no JVD, supple, symmetrical, trachea midline and thyroid not enlarged, symmetric, no tenderness/mass/nodules  Lungs: clear

## 2019-12-09 RX ORDER — SODIUM CHLORIDE, SODIUM LACTATE, POTASSIUM CHLORIDE, CALCIUM CHLORIDE 600; 310; 30; 20 MG/100ML; MG/100ML; MG/100ML; MG/100ML
INJECTION, SOLUTION INTRAVENOUS CONTINUOUS
Status: CANCELLED | OUTPATIENT
Start: 2019-12-09

## 2019-12-11 ENCOUNTER — APPOINTMENT (OUTPATIENT)
Dept: LAB | Facility: HOSPITAL | Age: 74
End: 2019-12-11
Payer: MEDICARE

## 2019-12-11 DIAGNOSIS — K57.92 DIVERTICULITIS: ICD-10-CM

## 2019-12-11 PROCEDURE — 80048 BASIC METABOLIC PNL TOTAL CA: CPT

## 2019-12-11 PROCEDURE — 36415 COLL VENOUS BLD VENIPUNCTURE: CPT

## 2019-12-11 PROCEDURE — 85027 COMPLETE CBC AUTOMATED: CPT

## 2019-12-12 ENCOUNTER — TELEPHONE (OUTPATIENT)
Dept: FAMILY MEDICINE CLINIC | Facility: CLINIC | Age: 74
End: 2019-12-12

## 2019-12-12 DIAGNOSIS — Z01.818 PRE-OP TESTING: Primary | ICD-10-CM

## 2019-12-12 NOTE — TELEPHONE ENCOUNTER
Order placed and paperwork in [facility] 124 awaiting pt to call back to schedule his appt

## 2019-12-12 NOTE — TELEPHONE ENCOUNTER
Received fax from [address] requiring a Pre-Op. Pt having L shoulder arthroscopic rotator cuff repair,subacromail decompression ,distal clavicle excision on 3/20/20 with Dr Frankey Salisbury. EKG Requested as well.   LMOM for pt to call back to schedul

## 2020-01-07 ENCOUNTER — ANESTHESIA EVENT (OUTPATIENT)
Dept: SURGERY | Facility: HOSPITAL | Age: 75
DRG: 330 | End: 2020-01-07
Payer: MEDICARE

## 2020-01-07 NOTE — ANESTHESIA PREPROCEDURE EVALUATION
PRE-OP EVALUATION    Patient Name: Bri Burden.    Pre-op Diagnosis: Diverticulitis [K57.92]    Procedure(s):  XI ROBOT ASSISTED LOW ANTERIOR COLON RESECTION, RIGID PROCTOSCOPY POSSIBLE OPEN    Surgeon(s) and Role:     Viktor Santana MD - Pr cardiovascular ROS. Endo/Other    Negative endo/other ROS. Pulmonary    Negative pulmonary ROS. Neuro/Psych    Negative neuro/psych ROS. notable dental history. Pulmonary    Pulmonary exam normal.                 Other findings            ASA: 2   Plan: general  NPO status verified and     Post-procedure pain management plan discussed with surgeon and patient.     Comment: Discussed

## 2020-01-08 ENCOUNTER — ANESTHESIA (OUTPATIENT)
Dept: SURGERY | Facility: HOSPITAL | Age: 75
DRG: 330 | End: 2020-01-08
Payer: MEDICARE

## 2020-01-08 ENCOUNTER — HOSPITAL ENCOUNTER (INPATIENT)
Facility: HOSPITAL | Age: 75
LOS: 2 days | Discharge: HOME | DRG: 330 | End: 2020-01-10
Attending: SURGERY | Admitting: SURGERY
Payer: MEDICARE

## 2020-01-08 DIAGNOSIS — K57.92 DIVERTICULITIS: Primary | ICD-10-CM

## 2020-01-08 LAB — GLUCOSE BLD-MCNC: 133 MG/DL (ref 70–99)

## 2020-01-08 PROCEDURE — 8E0W4CZ ROBOTIC ASSISTED PROCEDURE OF TRUNK REGION, PERCUTANEOUS ENDOSCOPIC APPROACH: ICD-10-PCS | Performed by: SURGERY

## 2020-01-08 PROCEDURE — 99223 1ST HOSP IP/OBS HIGH 75: CPT | Performed by: HOSPITALIST

## 2020-01-08 PROCEDURE — 0DTN4ZZ RESECTION OF SIGMOID COLON, PERCUTANEOUS ENDOSCOPIC APPROACH: ICD-10-PCS | Performed by: SURGERY

## 2020-01-08 RX ORDER — HEPARIN SODIUM 5000 [USP'U]/ML
5000 INJECTION, SOLUTION INTRAVENOUS; SUBCUTANEOUS ONCE
Status: COMPLETED | OUTPATIENT
Start: 2020-01-08 | End: 2020-01-08

## 2020-01-08 RX ORDER — ONDANSETRON 2 MG/ML
4 INJECTION INTRAMUSCULAR; INTRAVENOUS AS NEEDED
Status: DISCONTINUED | OUTPATIENT
Start: 2020-01-08 | End: 2020-01-08 | Stop reason: HOSPADM

## 2020-01-08 RX ORDER — DOCUSATE SODIUM 100 MG/1
100 CAPSULE, LIQUID FILLED ORAL 2 TIMES DAILY
Status: DISCONTINUED | OUTPATIENT
Start: 2020-01-08 | End: 2020-01-10

## 2020-01-08 RX ORDER — EPHEDRINE SULFATE 50 MG/ML
INJECTION, SOLUTION INTRAVENOUS AS NEEDED
Status: DISCONTINUED | OUTPATIENT
Start: 2020-01-08 | End: 2020-01-08 | Stop reason: SURG

## 2020-01-08 RX ORDER — GABAPENTIN 100 MG/1
200 CAPSULE ORAL NIGHTLY
Status: DISCONTINUED | OUTPATIENT
Start: 2020-01-08 | End: 2020-01-10

## 2020-01-08 RX ORDER — ACETAMINOPHEN 500 MG
1000 TABLET ORAL EVERY 8 HOURS
Status: DISCONTINUED | OUTPATIENT
Start: 2020-01-08 | End: 2020-01-10

## 2020-01-08 RX ORDER — OXYCODONE HYDROCHLORIDE 15 MG/1
15 TABLET ORAL EVERY 4 HOURS PRN
Status: DISCONTINUED | OUTPATIENT
Start: 2020-01-08 | End: 2020-01-10

## 2020-01-08 RX ORDER — SODIUM CHLORIDE, SODIUM LACTATE, POTASSIUM CHLORIDE, CALCIUM CHLORIDE 600; 310; 30; 20 MG/100ML; MG/100ML; MG/100ML; MG/100ML
INJECTION, SOLUTION INTRAVENOUS CONTINUOUS PRN
Status: DISCONTINUED | OUTPATIENT
Start: 2020-01-08 | End: 2020-01-08 | Stop reason: SURG

## 2020-01-08 RX ORDER — HYDROMORPHONE HYDROCHLORIDE 1 MG/ML
0.2 INJECTION, SOLUTION INTRAMUSCULAR; INTRAVENOUS; SUBCUTANEOUS EVERY 2 HOUR PRN
Status: DISCONTINUED | OUTPATIENT
Start: 2020-01-08 | End: 2020-01-10

## 2020-01-08 RX ORDER — POLYETHYLENE GLYCOL 3350 17 G/17G
17 POWDER, FOR SOLUTION ORAL DAILY PRN
Status: DISCONTINUED | OUTPATIENT
Start: 2020-01-08 | End: 2020-01-10

## 2020-01-08 RX ORDER — ROCURONIUM BROMIDE 10 MG/ML
INJECTION, SOLUTION INTRAVENOUS AS NEEDED
Status: DISCONTINUED | OUTPATIENT
Start: 2020-01-08 | End: 2020-01-08 | Stop reason: SURG

## 2020-01-08 RX ORDER — ACETAMINOPHEN 500 MG
1000 TABLET ORAL ONCE
Status: DISCONTINUED | OUTPATIENT
Start: 2020-01-08 | End: 2020-01-08 | Stop reason: HOSPADM

## 2020-01-08 RX ORDER — ACETAMINOPHEN 500 MG
1000 TABLET ORAL ONCE AS NEEDED
Status: DISCONTINUED | OUTPATIENT
Start: 2020-01-08 | End: 2020-01-08 | Stop reason: HOSPADM

## 2020-01-08 RX ORDER — TAMSULOSIN HYDROCHLORIDE 0.4 MG/1
0.4 CAPSULE ORAL DAILY
COMMUNITY
End: 2020-08-04

## 2020-01-08 RX ORDER — ONDANSETRON 2 MG/ML
INJECTION INTRAMUSCULAR; INTRAVENOUS AS NEEDED
Status: DISCONTINUED | OUTPATIENT
Start: 2020-01-08 | End: 2020-01-08 | Stop reason: SURG

## 2020-01-08 RX ORDER — SODIUM CHLORIDE, SODIUM LACTATE, POTASSIUM CHLORIDE, CALCIUM CHLORIDE 600; 310; 30; 20 MG/100ML; MG/100ML; MG/100ML; MG/100ML
INJECTION, SOLUTION INTRAVENOUS CONTINUOUS
Status: DISCONTINUED | OUTPATIENT
Start: 2020-01-08 | End: 2020-01-08 | Stop reason: HOSPADM

## 2020-01-08 RX ORDER — TRAMADOL HYDROCHLORIDE 50 MG/1
50 TABLET ORAL ONCE AS NEEDED
Status: DISCONTINUED | OUTPATIENT
Start: 2020-01-08 | End: 2020-01-08 | Stop reason: HOSPADM

## 2020-01-08 RX ORDER — DEXAMETHASONE SODIUM PHOSPHATE 4 MG/ML
VIAL (ML) INJECTION AS NEEDED
Status: DISCONTINUED | OUTPATIENT
Start: 2020-01-08 | End: 2020-01-08 | Stop reason: SURG

## 2020-01-08 RX ORDER — KETOROLAC TROMETHAMINE 15 MG/ML
15 INJECTION, SOLUTION INTRAMUSCULAR; INTRAVENOUS EVERY 6 HOURS PRN
Status: DISCONTINUED | OUTPATIENT
Start: 2020-01-08 | End: 2020-01-10

## 2020-01-08 RX ORDER — BUPIVACAINE HYDROCHLORIDE 5 MG/ML
INJECTION, SOLUTION EPIDURAL; INTRACAUDAL AS NEEDED
Status: DISCONTINUED | OUTPATIENT
Start: 2020-01-08 | End: 2020-01-08 | Stop reason: HOSPADM

## 2020-01-08 RX ORDER — MEPERIDINE HYDROCHLORIDE 25 MG/ML
12.5 INJECTION INTRAMUSCULAR; INTRAVENOUS; SUBCUTANEOUS AS NEEDED
Status: DISCONTINUED | OUTPATIENT
Start: 2020-01-08 | End: 2020-01-08 | Stop reason: HOSPADM

## 2020-01-08 RX ORDER — GABAPENTIN 100 MG/1
200 CAPSULE ORAL ONCE
Status: COMPLETED | OUTPATIENT
Start: 2020-01-08 | End: 2020-01-08

## 2020-01-08 RX ORDER — SODIUM CHLORIDE, SODIUM LACTATE, POTASSIUM CHLORIDE, CALCIUM CHLORIDE 600; 310; 30; 20 MG/100ML; MG/100ML; MG/100ML; MG/100ML
1 INJECTION, SOLUTION INTRAVENOUS CONTINUOUS
Status: DISCONTINUED | OUTPATIENT
Start: 2020-01-08 | End: 2020-01-10

## 2020-01-08 RX ORDER — HYDROMORPHONE HYDROCHLORIDE 1 MG/ML
0.4 INJECTION, SOLUTION INTRAMUSCULAR; INTRAVENOUS; SUBCUTANEOUS EVERY 2 HOUR PRN
Status: DISCONTINUED | OUTPATIENT
Start: 2020-01-08 | End: 2020-01-10

## 2020-01-08 RX ORDER — MAGNESIUM OXIDE 400 MG (241.3 MG MAGNESIUM) TABLET
400 TABLET DAILY
Status: DISCONTINUED | OUTPATIENT
Start: 2020-01-08 | End: 2020-01-10

## 2020-01-08 RX ORDER — GLYCOPYRROLATE 0.2 MG/ML
INJECTION, SOLUTION INTRAMUSCULAR; INTRAVENOUS AS NEEDED
Status: DISCONTINUED | OUTPATIENT
Start: 2020-01-08 | End: 2020-01-08 | Stop reason: SURG

## 2020-01-08 RX ORDER — NALOXONE HYDROCHLORIDE 0.4 MG/ML
80 INJECTION, SOLUTION INTRAMUSCULAR; INTRAVENOUS; SUBCUTANEOUS AS NEEDED
Status: DISCONTINUED | OUTPATIENT
Start: 2020-01-08 | End: 2020-01-08 | Stop reason: HOSPADM

## 2020-01-08 RX ORDER — SODIUM CHLORIDE 9 MG/ML
INJECTION, SOLUTION INTRAVENOUS CONTINUOUS
Status: DISCONTINUED | OUTPATIENT
Start: 2020-01-08 | End: 2020-01-10

## 2020-01-08 RX ORDER — INDOCYANINE GREEN AND WATER 25 MG
KIT INJECTION AS NEEDED
Status: DISCONTINUED | OUTPATIENT
Start: 2020-01-08 | End: 2020-01-08 | Stop reason: SURG

## 2020-01-08 RX ORDER — LIDOCAINE HYDROCHLORIDE ANHYDROUS AND DEXTROSE MONOHYDRATE .8; 5 G/100ML; G/100ML
INJECTION, SOLUTION INTRAVENOUS CONTINUOUS PRN
Status: DISCONTINUED | OUTPATIENT
Start: 2020-01-08 | End: 2020-01-08 | Stop reason: SURG

## 2020-01-08 RX ORDER — METRONIDAZOLE 500 MG/100ML
500 INJECTION, SOLUTION INTRAVENOUS ONCE
Status: COMPLETED | OUTPATIENT
Start: 2020-01-08 | End: 2020-01-08

## 2020-01-08 RX ORDER — MIDAZOLAM HYDROCHLORIDE 1 MG/ML
1 INJECTION INTRAMUSCULAR; INTRAVENOUS EVERY 5 MIN PRN
Status: DISCONTINUED | OUTPATIENT
Start: 2020-01-08 | End: 2020-01-08 | Stop reason: HOSPADM

## 2020-01-08 RX ORDER — LIDOCAINE HYDROCHLORIDE 10 MG/ML
INJECTION, SOLUTION EPIDURAL; INFILTRATION; INTRACAUDAL; PERINEURAL AS NEEDED
Status: DISCONTINUED | OUTPATIENT
Start: 2020-01-08 | End: 2020-01-08 | Stop reason: HOSPADM

## 2020-01-08 RX ORDER — HYDROMORPHONE HYDROCHLORIDE 1 MG/ML
0.8 INJECTION, SOLUTION INTRAMUSCULAR; INTRAVENOUS; SUBCUTANEOUS EVERY 2 HOUR PRN
Status: DISCONTINUED | OUTPATIENT
Start: 2020-01-08 | End: 2020-01-10

## 2020-01-08 RX ORDER — POLYETHYLENE GLYCOL-3350 AND ELECTROLYTES WITH FLAVOR PACK 240; 5.84; 2.98; 6.72; 22.72 G/278.26G; G/278.26G; G/278.26G; G/278.26G; G/278.26G
POWDER, FOR SOLUTION ORAL ONCE
Refills: 0 | Status: ON HOLD | COMMUNITY
End: 2020-01-10

## 2020-01-08 RX ORDER — OXYCODONE HYDROCHLORIDE 5 MG/1
10 TABLET ORAL EVERY 4 HOURS PRN
Status: DISCONTINUED | OUTPATIENT
Start: 2020-01-08 | End: 2020-01-10

## 2020-01-08 RX ORDER — ENOXAPARIN SODIUM 100 MG/ML
40 INJECTION SUBCUTANEOUS DAILY
Status: DISCONTINUED | OUTPATIENT
Start: 2020-01-09 | End: 2020-01-10

## 2020-01-08 RX ORDER — KETOROLAC TROMETHAMINE 30 MG/ML
INJECTION, SOLUTION INTRAMUSCULAR; INTRAVENOUS AS NEEDED
Status: DISCONTINUED | OUTPATIENT
Start: 2020-01-08 | End: 2020-01-08 | Stop reason: SURG

## 2020-01-08 RX ORDER — OXYCODONE HYDROCHLORIDE 5 MG/1
5 TABLET ORAL EVERY 4 HOURS PRN
Status: DISCONTINUED | OUTPATIENT
Start: 2020-01-08 | End: 2020-01-10

## 2020-01-08 RX ORDER — NEOSTIGMINE METHYLSULFATE 1 MG/ML
INJECTION INTRAVENOUS AS NEEDED
Status: DISCONTINUED | OUTPATIENT
Start: 2020-01-08 | End: 2020-01-08 | Stop reason: SURG

## 2020-01-08 RX ADMIN — INDOCYANINE GREEN AND WATER 7.5 MG: 25 MG KIT INJECTION at 09:33:00

## 2020-01-08 RX ADMIN — SODIUM CHLORIDE, SODIUM LACTATE, POTASSIUM CHLORIDE, CALCIUM CHLORIDE: 600; 310; 30; 20 INJECTION, SOLUTION INTRAVENOUS at 10:00:00

## 2020-01-08 RX ADMIN — INDOCYANINE GREEN AND WATER 7.5 MG: 25 MG KIT INJECTION at 09:06:00

## 2020-01-08 RX ADMIN — KETOROLAC TROMETHAMINE 30 MG: 30 INJECTION, SOLUTION INTRAMUSCULAR; INTRAVENOUS at 09:56:00

## 2020-01-08 RX ADMIN — ROCURONIUM BROMIDE 10 MG: 10 INJECTION, SOLUTION INTRAVENOUS at 09:06:00

## 2020-01-08 RX ADMIN — DEXAMETHASONE SODIUM PHOSPHATE 8 MG: 4 MG/ML VIAL (ML) INJECTION at 07:43:00

## 2020-01-08 RX ADMIN — ROCURONIUM BROMIDE 10 MG: 10 INJECTION, SOLUTION INTRAVENOUS at 08:35:00

## 2020-01-08 RX ADMIN — EPHEDRINE SULFATE 10 MG: 50 INJECTION, SOLUTION INTRAVENOUS at 08:06:00

## 2020-01-08 RX ADMIN — NEOSTIGMINE METHYLSULFATE 5 MG: 1 INJECTION INTRAVENOUS at 09:56:00

## 2020-01-08 RX ADMIN — ROCURONIUM BROMIDE 10 MG: 10 INJECTION, SOLUTION INTRAVENOUS at 08:00:00

## 2020-01-08 RX ADMIN — EPHEDRINE SULFATE 10 MG: 50 INJECTION, SOLUTION INTRAVENOUS at 09:33:00

## 2020-01-08 RX ADMIN — LIDOCAINE HYDROCHLORIDE ANHYDROUS AND DEXTROSE MONOHYDRATE 2 MG/KG/HR: .8; 5 INJECTION, SOLUTION INTRAVENOUS at 07:45:00

## 2020-01-08 RX ADMIN — SODIUM CHLORIDE, SODIUM LACTATE, POTASSIUM CHLORIDE, CALCIUM CHLORIDE: 600; 310; 30; 20 INJECTION, SOLUTION INTRAVENOUS at 07:28:00

## 2020-01-08 RX ADMIN — METRONIDAZOLE 500 MG: 500 INJECTION, SOLUTION INTRAVENOUS at 07:45:00

## 2020-01-08 RX ADMIN — ONDANSETRON 4 MG: 2 INJECTION INTRAMUSCULAR; INTRAVENOUS at 09:56:00

## 2020-01-08 RX ADMIN — ROCURONIUM BROMIDE 40 MG: 10 INJECTION, SOLUTION INTRAVENOUS at 07:35:00

## 2020-01-08 RX ADMIN — GLYCOPYRROLATE 0.8 MG: 0.2 INJECTION, SOLUTION INTRAMUSCULAR; INTRAVENOUS at 09:56:00

## 2020-01-08 NOTE — CONSULTS
[place] HOSPITALIST  [address]  Patient Status:  Inpatient    10/13/1945 MRN [id number]   [facility] 3NW-A Attending Elle Costello MD   Hosp Day # 0 PCP Nickie Sweet MD     Reason for consult: BPH  Requested He has never used smokeless tobacco. He reports current alcohol use of about 5.0 - 6.0 standard drinks of alcohol per week. He reports that he does not use drugs.   Family History:   Family History   Problem Relation Age of Onset   • Other (anxiety disorder oriented x 3. HEENT: Normocephalic atraumatic. EOM-I. Anicteric. Neck: Supple, No JVD, No carotid bruits. Respiratory: Clear to auscultation bilaterally. No wheezes. No rhonchi. Cardiovascular: S1, S2. Regular rhythm, regular rate.  No murmurs, rubs or

## 2020-01-08 NOTE — H&P
Wilver Santosman. is a 69year old male. No chief complaint on file. No ref. provider found    HPI:  Patient presents with multiple episodes of acute diverticulitis involving the sigmoid colon.  He has had 5 or 6 episodes in the last 3 years wi History    Tobacco Use      Smoking status: Never Smoker      Smokeless tobacco: Never Used    Alcohol use:  Yes      Alcohol/week: 5.0 - 6.0 standard drinks      Types: 5 - 6 Standard drinks or equivalent per week      Frequency: 4 or more times a week sigmoid colon resection. The rationale risks benefits and alternatives were explained to them in detail. He is a good candidate for minimally invasive approach. We'll proceed with a robotic-assisted low anterior colon resection with primary anastomosis. we

## 2020-01-08 NOTE — PROGRESS NOTES
Vss. Pt a&ox3. Pt on ra with 02 sats wnl. Lungs cta. Pt denies difficulty breathing or sob. Pt denies chest pain. Pt denies n/v. Tolerating water well. Reports last bm was this am prior to surgery. Denies passing flatus. bs hypoactive on auscultation.  [facility] [place]

## 2020-01-08 NOTE — ANESTHESIA PROCEDURE NOTES
Airway  Urgency: elective    Airway not difficult    General Information and Staff    Patient location during procedure: OR  Anesthesiologist: Danette Willard MD  Resident/CRNA: David Stover CRNA  Performed: CRNA     Indications and Patient Condition

## 2020-01-08 NOTE — ANESTHESIA POSTPROCEDURE EVALUATION
[address] [facility]  Patient Status:  Surgery Admit - Inpt   Age/Gender 69year old male MRN [id number]   [facility] SURGERY Attending Christiano Givens MD   Hosp Day # 0 PCP Pam Vaughn MD       Anesthesia Post-op No

## 2020-01-08 NOTE — OPERATIVE REPORT
[address]                                                         Operative Note    Janelle Sellers.  Location: [place] [address] Perioperative   CSN [id number] MRN [id number]   Admission Date 1/8/2020 Procedure Date 1/8/2020   Attending Physician Oly Alejandra dissection occurred. The mesenteric vasculature supplying this abnormal segment of colon and distal to this area was ligated with the vessel sealer.   The colon was then mobilized away from the left lateral sidewall incising the white line of [place] all the Patient was awoken and taken to recovery room in satisfactory condition        Meaghan Mabry MD  1/8/2020  10:41 AM

## 2020-01-08 NOTE — PROGRESS NOTES
01/08/20 1347   Clinical Encounter Type   Referral From Nurse   Referral To   ([facility] as per the consult received)

## 2020-01-08 NOTE — PROGRESS NOTES
[facility] Note:  Age Based Dose Adjustment    Kristen Castro. has been prescribed ketorolac (TORADOL) 30 mg IV every 6 hours prn. Patient is >71 years old therefore the dose has been adjusted to 15 mg IV every 6 hours prn.     Thank you,  Luh

## 2020-01-08 NOTE — BRIEF OP NOTE
Pre-Operative Diagnosis: Diverticulitis [K57.92]     Post-Operative Diagnosis: Diverticulitis [K57.92]      Procedure Performed:   Procedure(s):  XI ROBOT ASSISTED LOW ANTERIOR COLON RESECTION, RIGID PROCTOSCOPY MOBILIZATION OF SPLENIC FLEUXURE    Surgeon(

## 2020-01-09 LAB
ANION GAP SERPL CALC-SCNC: 5 MMOL/L (ref 0–18)
BASOPHILS # BLD AUTO: 0.03 X10(3) UL (ref 0–0.2)
BASOPHILS NFR BLD AUTO: 0.3 %
BUN BLD-MCNC: 11 MG/DL (ref 7–18)
BUN/CREAT SERPL: 12.2 (ref 10–20)
CALCIUM BLD-MCNC: 8.3 MG/DL (ref 8.5–10.1)
CHLORIDE SERPL-SCNC: 108 MMOL/L (ref 98–112)
CO2 SERPL-SCNC: 26 MMOL/L (ref 21–32)
CREAT BLD-MCNC: 0.9 MG/DL (ref 0.7–1.3)
DEPRECATED RDW RBC AUTO: 43.7 FL (ref 35.1–46.3)
EOSINOPHIL # BLD AUTO: 0.05 X10(3) UL (ref 0–0.7)
EOSINOPHIL NFR BLD AUTO: 0.5 %
ERYTHROCYTE [DISTWIDTH] IN BLOOD BY AUTOMATED COUNT: 12.5 % (ref 11–15)
GLUCOSE BLD-MCNC: 103 MG/DL (ref 70–99)
HAV IGM SER QL: 2.2 MG/DL (ref 1.6–2.6)
HCT VFR BLD AUTO: 38.4 % (ref 39–53)
HGB BLD-MCNC: 13.1 G/DL (ref 13–17.5)
IMM GRANULOCYTES # BLD AUTO: 0.05 X10(3) UL (ref 0–1)
IMM GRANULOCYTES NFR BLD: 0.5 %
LYMPHOCYTES # BLD AUTO: 1.15 X10(3) UL (ref 1–4)
LYMPHOCYTES NFR BLD AUTO: 10.8 %
MCH RBC QN AUTO: 32.2 PG (ref 26–34)
MCHC RBC AUTO-ENTMCNC: 34.1 G/DL (ref 31–37)
MCV RBC AUTO: 94.3 FL (ref 80–100)
MONOCYTES # BLD AUTO: 0.98 X10(3) UL (ref 0.1–1)
MONOCYTES NFR BLD AUTO: 9.2 %
NEUTROPHILS # BLD AUTO: 8.35 X10 (3) UL (ref 1.5–7.7)
NEUTROPHILS # BLD AUTO: 8.35 X10(3) UL (ref 1.5–7.7)
NEUTROPHILS NFR BLD AUTO: 78.7 %
OSMOLALITY SERPL CALC.SUM OF ELEC: 288 MOSM/KG (ref 275–295)
PHOSPHATE SERPL-MCNC: 2.7 MG/DL (ref 2.5–4.9)
PLATELET # BLD AUTO: 183 10(3)UL (ref 150–450)
POTASSIUM SERPL-SCNC: 4 MMOL/L (ref 3.5–5.1)
RBC # BLD AUTO: 4.07 X10(6)UL (ref 3.8–5.8)
SODIUM SERPL-SCNC: 139 MMOL/L (ref 136–145)
WBC # BLD AUTO: 10.6 X10(3) UL (ref 4–11)

## 2020-01-09 RX ORDER — TAMSULOSIN HYDROCHLORIDE 0.4 MG/1
0.4 CAPSULE ORAL DAILY
Status: DISCONTINUED | OUTPATIENT
Start: 2020-01-09 | End: 2020-01-10

## 2020-01-09 NOTE — PROGRESS NOTES
No hospitalist needs at thsi time. Will sign off; hospitalist portion of d/c med rec complete. Please call if any further needs or questions arise.

## 2020-01-09 NOTE — PLAN OF CARE
Pt is alert and oriented X4. Lungs are clear bilaterally. RA. Bowel sounds are active. Pt is not passing flatus. Tolerating clear liquid diet. Advance to soft in the am. Denies nausea. 4 lap sites to abdomen with dermabond. Right incision with Aquacel.  [place] ADULT - FALL  Goal: Free from fall injury  Description  INTERVENTIONS:  - Assess pt frequently for physical needs  - Identify cognitive and physical deficits and behaviors that affect risk of falls. - [place] fall precautions as indicated by assessment.

## 2020-01-09 NOTE — PHYSICAL THERAPY NOTE
PT order received via post-surgical order set, chart reviewed. Pt up ad charley, ambulating frequently. Confirmed with RN, no skilled intervention needed at this time. Please re-consult if pt experiences functional decline.

## 2020-01-09 NOTE — PROGRESS NOTES
[facility]  Progress Note    Phoebe Nikkymichele.  Patient Status:  Inpatient    10/13/1945 MRN [id number]   [facility] 3NW-A Attending Natividad Millan MD   Hosp Day # 1 PCP Michelle Tejeda MD     Subjective:    Patient reports inc

## 2020-01-09 NOTE — PLAN OF CARE
A&Ox4. VSS. RA. 4 lap sites, dermabond over incision. Having multiple BMs. Voiding clear yellow urine. Reports mild-moderate \"muscle pain\" on right side of rib cage and right anterior shoulder. Reports discomfort in abdomen. Tolerating diet.  Walking freq frequently for physical needs  - Identify cognitive and physical deficits and behaviors that affect risk of falls.   - [place] fall precautions as indicated by assessment.  - Educate pt/family on patient safety including physical limitations  - Instruct p

## 2020-01-10 VITALS
HEIGHT: 70 IN | TEMPERATURE: 98 F | SYSTOLIC BLOOD PRESSURE: 119 MMHG | OXYGEN SATURATION: 98 % | BODY MASS INDEX: 24.94 KG/M2 | DIASTOLIC BLOOD PRESSURE: 65 MMHG | RESPIRATION RATE: 20 BRPM | HEART RATE: 60 BPM | WEIGHT: 174.19 LBS

## 2020-01-10 RX ORDER — TRAMADOL HYDROCHLORIDE 50 MG/1
50 TABLET ORAL EVERY 6 HOURS PRN
Qty: 20 TABLET | Refills: 0 | Status: SHIPPED | OUTPATIENT
Start: 2020-01-10 | End: 2020-01-17

## 2020-01-10 NOTE — PLAN OF CARE
Pt is alert and oriented x4. Bowel sounds are active. Pt is passing flatus and loose stool. Denies nausea. Reports right shoulder and abdominal pain. Pain medication administered. Heat packs to shoulder for comfort. Tolerating soft diet. Up ad charley.  IV is s pt frequently for physical needs  - Identify cognitive and physical deficits and behaviors that affect risk of falls.   - [place] fall precautions as indicated by assessment.  - Educate pt/family on patient safety including physical limitations  - Instruc

## 2020-01-10 NOTE — PROGRESS NOTES
Patient discharged at this time with wife to [employer]. Discharge instructions including activity restrictions, diet, incision care, follow up, and medications given. IV access taken out.  Tramadol prescription sent home with patient-educated on use and w

## 2020-01-10 NOTE — DISCHARGE SUMMARY
[facility]  Discharge Summary    Kristen Fuentes.  Patient Status:  Inpatient    10/13/1945 MRN [id number]   [facility] 3NW-A Attending Luzmaria Contreras MD   Hosp Day # 2 PCP Lalo Escobedo MD     Date of Admission: 2020 daily.      PREBIOTIC PRODUCT OR  Take 1 capsule by mouth daily. !! Acidophilus/Pectin (PROBIOTIC) Oral Cap  Take 1 capsule by mouth daily. !! - Potential duplicate medications found. Please discuss with provider.       STOP taking these medications

## 2020-01-10 NOTE — PROGRESS NOTES
[facility]  Progress Note    Carlos Saucedo.  Patient Status:  Inpatient    10/13/1945 MRN [id number]   [facility] 3NW-A Attending Radha Arriola MD   Hosp Day # 2 PCP Maggie Burton MD     Subjective:    Patient tolerating

## 2020-01-10 NOTE — PLAN OF CARE
A&Ox4. VSS. RA. Coughing and deep breathing as well as using IS. Active bowel sounds with minimal tenderness to abdomen. Passing gas. Tolerating diet. Last BM on 01/09/2020. Voiding clear yellow urine.  Pain controlled with oral pain medications and heat th injury  Description  INTERVENTIONS:  - Assess pt frequently for physical needs  - Identify cognitive and physical deficits and behaviors that affect risk of falls.   - [place] fall precautions as indicated by assessment.  - Educate pt/family on patient sa

## 2020-01-10 NOTE — PROGRESS NOTES
PATIENT IS SALINE LOCKED, ON ROOM AIR, CASANOVA DISCONTINUED AND PATIENT IS VOIDING WELL, AMBULATES INDEPENDENTLY, OXYCODONE PRN PAIN, INCISIONS ARE C/D/I, TOLERATING A LOW FIBER ERAS DIET, HAVING BOWEL MOVEMENTS-R/O C-DIFF.  FAMILY PRESENT AT [facility]

## 2020-01-29 ENCOUNTER — TELEPHONE (OUTPATIENT)
Dept: FAMILY MEDICINE CLINIC | Facility: CLINIC | Age: 75
End: 2020-01-29

## 2020-01-29 DIAGNOSIS — Z13.6 SCREENING FOR CARDIOVASCULAR CONDITION: Primary | ICD-10-CM

## 2020-01-29 DIAGNOSIS — Z12.5 SCREENING FOR MALIGNANT NEOPLASM OF PROSTATE: ICD-10-CM

## 2020-01-29 NOTE — TELEPHONE ENCOUNTER
Please enter lab orders for the patient's upcoming physical appointment. Physical scheduled:    Your appointments     Date & Time Appointment Department [facility])    Jun 16, 2020 10:30 AM CDT Medicare Annual Well Visit with MD Vickie Lomas

## 2020-02-24 ENCOUNTER — OFFICE VISIT (OUTPATIENT)
Dept: FAMILY MEDICINE CLINIC | Facility: CLINIC | Age: 75
End: 2020-02-24
Payer: MEDICARE

## 2020-02-24 VITALS
HEIGHT: 69.5 IN | WEIGHT: 177 LBS | TEMPERATURE: 98 F | DIASTOLIC BLOOD PRESSURE: 62 MMHG | SYSTOLIC BLOOD PRESSURE: 108 MMHG | BODY MASS INDEX: 25.63 KG/M2 | HEART RATE: 72 BPM | RESPIRATION RATE: 18 BRPM

## 2020-02-24 DIAGNOSIS — R35.1 BPH ASSOCIATED WITH NOCTURIA: ICD-10-CM

## 2020-02-24 DIAGNOSIS — N40.1 BPH ASSOCIATED WITH NOCTURIA: ICD-10-CM

## 2020-02-24 DIAGNOSIS — Z01.818 PREOP EXAMINATION: Primary | ICD-10-CM

## 2020-02-24 DIAGNOSIS — K21.00 REFLUX ESOPHAGITIS: ICD-10-CM

## 2020-02-24 DIAGNOSIS — M75.102 TEAR OF LEFT ROTATOR CUFF, UNSPECIFIED TEAR EXTENT, UNSPECIFIED WHETHER TRAUMATIC: ICD-10-CM

## 2020-02-24 PROCEDURE — 99214 OFFICE O/P EST MOD 30 MIN: CPT | Performed by: FAMILY MEDICINE

## 2020-02-24 NOTE — PROGRESS NOTES
Patient presents with:  Pre-Op Exam: Pt scheduled for left shoulder arthroscopic rotator cuff repair on 3/20/2020    HPI:   Jaelyn Robertson. is a 69year old male who is being evaluated for pre-surgical clearance at the request of Dr. Zehra Ayala.    Surge • Anesth,biceps tendon repair     • Appendectomy  1969   • Carpal tunnel release     • Cataract     • Colonoscopy  2006    prev.2002   • Cortisone injection  2/1/2017    both shoulder   • Hip replacement surgery Right 02/10/2015   • Knee replacement surg normal  Neck: no adenopathy, no JVD, supple, symmetrical, trachea midline and thyroid not enlarged, symmetric, no tenderness/mass/nodules  Lungs: clear to auscultation bilaterally  Heart: S1, S2 normal, no murmur, click, rub or gallop, regular rate and rhy 9. 2   CALCULATED OSMOLALITY      275 - 295 mOsm/kg 290   eGFR NON-AFR.  AMERICAN      >=60 84   eGFR       >=60 98   AST (SGOT)      15 - 37 U/L 21   ALT (SGPT)      16 - 61 U/L 27   ALKALINE PHOSPHATASE      45 - 117 U/L 65   Total Bilirubi

## 2020-02-27 ENCOUNTER — APPOINTMENT (OUTPATIENT)
Dept: LAB | Facility: HOSPITAL | Age: 75
End: 2020-02-27
Attending: FAMILY MEDICINE
Payer: MEDICARE

## 2020-02-27 ENCOUNTER — TELEPHONE (OUTPATIENT)
Dept: FAMILY MEDICINE CLINIC | Facility: CLINIC | Age: 75
End: 2020-02-27

## 2020-02-27 LAB
ALBUMIN SERPL-MCNC: 3.8 G/DL (ref 3.4–5)
ALBUMIN/GLOB SERPL: 1.1 {RATIO} (ref 1–2)
ALP LIVER SERPL-CCNC: 65 U/L (ref 45–117)
ALT SERPL-CCNC: 27 U/L (ref 16–61)
ANION GAP SERPL CALC-SCNC: 4 MMOL/L (ref 0–18)
AST SERPL-CCNC: 21 U/L (ref 15–37)
BILIRUB SERPL-MCNC: 0.7 MG/DL (ref 0.1–2)
BUN BLD-MCNC: 17 MG/DL (ref 7–18)
BUN/CREAT SERPL: 18.9 (ref 10–20)
CALCIUM BLD-MCNC: 9.2 MG/DL (ref 8.5–10.1)
CHLORIDE SERPL-SCNC: 107 MMOL/L (ref 98–112)
CHOLEST SMN-MCNC: 211 MG/DL (ref ?–200)
CO2 SERPL-SCNC: 28 MMOL/L (ref 21–32)
CREAT BLD-MCNC: 0.9 MG/DL (ref 0.7–1.3)
GLOBULIN PLAS-MCNC: 3.4 G/DL (ref 2.8–4.4)
GLUCOSE BLD-MCNC: 103 MG/DL (ref 70–99)
HDLC SERPL-MCNC: 45 MG/DL (ref 40–59)
LDLC SERPL CALC-MCNC: 138 MG/DL (ref ?–100)
M PROTEIN MFR SERPL ELPH: 7.2 G/DL (ref 6.4–8.2)
NONHDLC SERPL-MCNC: 166 MG/DL (ref ?–130)
OSMOLALITY SERPL CALC.SUM OF ELEC: 290 MOSM/KG (ref 275–295)
PATIENT FASTING Y/N/NP: YES
PATIENT FASTING Y/N/NP: YES
POTASSIUM SERPL-SCNC: 4.4 MMOL/L (ref 3.5–5.1)
SODIUM SERPL-SCNC: 139 MMOL/L (ref 136–145)
TRIGL SERPL-MCNC: 142 MG/DL (ref 30–149)
VLDLC SERPL CALC-MCNC: 28 MG/DL (ref 0–30)

## 2020-02-27 PROCEDURE — 36415 COLL VENOUS BLD VENIPUNCTURE: CPT | Performed by: FAMILY MEDICINE

## 2020-02-27 PROCEDURE — 93010 ELECTROCARDIOGRAM REPORT: CPT | Performed by: INTERNAL MEDICINE

## 2020-02-27 PROCEDURE — 80053 COMPREHEN METABOLIC PANEL: CPT | Performed by: FAMILY MEDICINE

## 2020-02-27 PROCEDURE — 93005 ELECTROCARDIOGRAM TRACING: CPT

## 2020-02-27 PROCEDURE — 80061 LIPID PANEL: CPT | Performed by: FAMILY MEDICINE

## 2020-02-27 NOTE — TELEPHONE ENCOUNTER
Patient completed medical records release form to obtain records from [facility].  Release faxed to [phone]

## 2020-02-28 LAB
ATRIAL RATE: 59 BPM
P AXIS: 57 DEGREES
P-R INTERVAL: 198 MS
Q-T INTERVAL: 422 MS
QRS DURATION: 96 MS
QTC CALCULATION (BEZET): 417 MS
R AXIS: -3 DEGREES
T AXIS: 35 DEGREES
VENTRICULAR RATE: 59 BPM

## 2020-06-10 ENCOUNTER — TELEPHONE (OUTPATIENT)
Dept: FAMILY MEDICINE CLINIC | Facility: CLINIC | Age: 75
End: 2020-06-10

## 2020-06-16 ENCOUNTER — OFFICE VISIT (OUTPATIENT)
Dept: FAMILY MEDICINE CLINIC | Facility: CLINIC | Age: 75
End: 2020-06-16
Payer: MEDICARE

## 2020-06-16 VITALS
WEIGHT: 180 LBS | SYSTOLIC BLOOD PRESSURE: 120 MMHG | TEMPERATURE: 98 F | HEIGHT: 69.5 IN | RESPIRATION RATE: 16 BRPM | BODY MASS INDEX: 26.06 KG/M2 | HEART RATE: 68 BPM | DIASTOLIC BLOOD PRESSURE: 70 MMHG

## 2020-06-16 DIAGNOSIS — Z13.31 DEPRESSION SCREENING: ICD-10-CM

## 2020-06-16 DIAGNOSIS — Z00.00 ENCOUNTER FOR ANNUAL HEALTH EXAMINATION: Primary | ICD-10-CM

## 2020-06-16 DIAGNOSIS — H00.015 HORDEOLUM EXTERNUM LEFT LOWER EYELID: ICD-10-CM

## 2020-06-16 DIAGNOSIS — K21.00 REFLUX ESOPHAGITIS: ICD-10-CM

## 2020-06-16 DIAGNOSIS — Z23 NEED FOR SHINGLES VACCINE: ICD-10-CM

## 2020-06-16 DIAGNOSIS — Z87.19 HISTORY OF DIVERTICULITIS: ICD-10-CM

## 2020-06-16 DIAGNOSIS — Z90.49 S/P COLON RESECTION: ICD-10-CM

## 2020-06-16 PROCEDURE — G0439 PPPS, SUBSEQ VISIT: HCPCS | Performed by: FAMILY MEDICINE

## 2020-06-16 PROCEDURE — G0444 DEPRESSION SCREEN ANNUAL: HCPCS | Performed by: FAMILY MEDICINE

## 2020-06-16 PROCEDURE — 90750 HZV VACC RECOMBINANT IM: CPT | Performed by: FAMILY MEDICINE

## 2020-06-16 PROCEDURE — 90471 IMMUNIZATION ADMIN: CPT | Performed by: FAMILY MEDICINE

## 2020-06-16 RX ORDER — POLYMYXIN B SULFATE AND TRIMETHOPRIM 1; 10000 MG/ML; [USP'U]/ML
1 SOLUTION OPHTHALMIC EVERY 4 HOURS
Qty: 10 ML | Refills: 0 | Status: SHIPPED | OUTPATIENT
Start: 2020-06-16 | End: 2020-10-03 | Stop reason: ALTCHOICE

## 2020-06-16 NOTE — PATIENT INSTRUCTIONS
Add Mr. Davenport to the Shingles vaccine list.   Lacey Ramires Jr.'s SCREENING SCHEDULE   Tests on this list are recommended by your physician but may not be covered, or covered at this frequency, by your insurer.  Please check with your insurance c Age 77     Colonoscopy Screen   Covered every 10 years- more often if abnormal Colonoscopy due on 07/01/2021 Update Health Maintenance if applicable    Flex Sigmoidoscopy Screen  Covered every 5 years No results found for this or any previous visit.  No trae found for this or any previous visit. This may be covered with your prescription benefits, but Medicare does not cover unless Medically needed    Zoster (Not covered by Medicare Part B) No orders found for this or any previous visit.  This may be covered wi

## 2020-06-16 NOTE — PROGRESS NOTES
HPI:   Jarrett Butler. is a 69year old male who presents for a Medicare Subsequent Annual Wellness visit (Pt already had Initial Annual Wellness). Preventative Screening  Colonoscopy - bowel resection 1/2020. Last formal c-scope 2016.     Pros smoked tobacco.    CAGE Alcohol screening   Ellie Ackerman was screened for Alcohol abuse and had a score of 0 so is at low risk.      Patient Care Team: Patient Care Team:  Idell Klinefelter, MD as PCP - General (Family Medicine)  Idell Klinefelter Cataract, Disorders of bursae and tendons in shoulder region, unspecified, Diverticulosis of colon (6/29/2012), Osteoarthritis, Pancreatic abnormality, Secondary cataract of right eye, Sleep disturbance, unspecified (6/29/2012), and Visual impairment.     H General Appearance:  Alert, cooperative, no distress, appears stated age   Head:  Normocephalic, without obvious abnormality, atraumatic   Eyes:  PERRL, conjunctiva/corneas clear, EOM's intact   Nose: Nares normal, septum midline, mucosa normal, no dr UTD.     2. Hordeolum externum left lower eyelid  Use warm compresses  Start eye drop  - Polymyxin B-Trimethoprim 66249-9.1 UNIT/ML-% Ophthalmic Solution; Place 1 drop into the left eye every 4 (four) hours. Dispense: 10 mL; Refill: 0    3.  History of div Flex Sigmoidoscopy Screen every 10 years No results found for this or any previous visit. No flowsheet data found. Fecal Occult Blood Annually No results found for: FOB No flowsheet data found.     Glaucoma Screening      Ophthalmology Visit Annually

## 2020-08-04 RX ORDER — TAMSULOSIN HYDROCHLORIDE 0.4 MG/1
CAPSULE ORAL
Qty: 90 CAPSULE | Refills: 3 | Status: SHIPPED | OUTPATIENT
Start: 2020-08-04 | End: 2021-11-24

## 2020-08-14 ENCOUNTER — TELEPHONE (OUTPATIENT)
Dept: FAMILY MEDICINE CLINIC | Facility: CLINIC | Age: 75
End: 2020-08-14

## 2020-08-14 NOTE — TELEPHONE ENCOUNTER
Patient has appt 8/17/2020 for second dose of Shingrix. His first dose was given 6/16/20, LOV 6/16/20 by Joshua Valadez M.D.   Order pended for your review

## 2020-08-17 ENCOUNTER — NURSE ONLY (OUTPATIENT)
Dept: FAMILY MEDICINE CLINIC | Facility: CLINIC | Age: 75
End: 2020-08-17
Payer: MEDICARE

## 2020-08-17 VITALS — TEMPERATURE: 99 F

## 2020-08-17 DIAGNOSIS — Z23 NEED FOR SHINGLES VACCINE: Primary | ICD-10-CM

## 2020-08-17 PROCEDURE — 90471 IMMUNIZATION ADMIN: CPT | Performed by: FAMILY MEDICINE

## 2020-08-17 PROCEDURE — 90750 HZV VACC RECOMBINANT IM: CPT | Performed by: FAMILY MEDICINE

## 2020-08-17 NOTE — PROGRESS NOTES
Patient presents for his second dose of Shingrix. He has no complaints. The Shingrix was ordered by Dr. Sharyn Monteiro M.D. He is given the VIS. He has signed the waiver. The Shingrix is given in the right deltoid.   After sitting for a few minutes the noe

## 2020-10-01 ENCOUNTER — HOSPITAL ENCOUNTER (OUTPATIENT)
Dept: MRI IMAGING | Facility: HOSPITAL | Age: 75
Discharge: HOME | End: 2020-10-01
Attending: INTERNAL MEDICINE
Payer: MEDICARE

## 2020-10-01 DIAGNOSIS — K86.2 PANCREAS CYST: ICD-10-CM

## 2020-10-01 PROCEDURE — 74183 MRI ABD W/O CNTR FLWD CNTR: CPT | Performed by: INTERNAL MEDICINE

## 2020-10-01 PROCEDURE — A9575 INJ GADOTERATE MEGLUMI 0.1ML: HCPCS | Performed by: INTERNAL MEDICINE

## 2020-10-01 NOTE — PROGRESS NOTES
Patient contacted and we reviewed results of MRI MRCP. Given increase in cyst and pancreatic ductal dilation recommend closer evaluation with EUS. Based on results will determine subsequent management plans.     He reports some new onset constipation ove

## 2020-10-13 RX ORDER — CALCIUM POLYCARBOPHIL 625 MG
6 TABLET ORAL DAILY
COMMUNITY

## 2020-10-13 RX ORDER — ACETAMINOPHEN 160 MG
2000 TABLET,DISINTEGRATING ORAL DAILY
COMMUNITY

## 2020-10-17 ENCOUNTER — LAB ENCOUNTER (OUTPATIENT)
Dept: LAB | Age: 75
End: 2020-10-17
Attending: INTERNAL MEDICINE
Payer: MEDICARE

## 2020-10-17 DIAGNOSIS — K86.2 PANCREAS CYST: ICD-10-CM

## 2020-10-19 ENCOUNTER — ANESTHESIA (OUTPATIENT)
Dept: ENDOSCOPY | Facility: HOSPITAL | Age: 75
End: 2020-10-19
Payer: MEDICARE

## 2020-10-19 ENCOUNTER — ANESTHESIA EVENT (OUTPATIENT)
Dept: ENDOSCOPY | Facility: HOSPITAL | Age: 75
End: 2020-10-19
Payer: MEDICARE

## 2020-10-19 ENCOUNTER — HOSPITAL ENCOUNTER (OUTPATIENT)
Facility: HOSPITAL | Age: 75
Setting detail: HOSPITAL OUTPATIENT SURGERY
Discharge: HOME | End: 2020-10-19
Attending: INTERNAL MEDICINE | Admitting: INTERNAL MEDICINE
Payer: MEDICARE

## 2020-10-19 VITALS
BODY MASS INDEX: 25.77 KG/M2 | DIASTOLIC BLOOD PRESSURE: 70 MMHG | RESPIRATION RATE: 18 BRPM | TEMPERATURE: 98 F | HEART RATE: 56 BPM | WEIGHT: 180 LBS | SYSTOLIC BLOOD PRESSURE: 115 MMHG | HEIGHT: 70 IN | OXYGEN SATURATION: 94 %

## 2020-10-19 DIAGNOSIS — K86.2 PANCREAS CYST: Primary | ICD-10-CM

## 2020-10-19 DIAGNOSIS — K86.89 DILATED PANCREATIC DUCT: ICD-10-CM

## 2020-10-19 PROCEDURE — 0DJ08ZZ INSPECTION OF UPPER INTESTINAL TRACT, VIA NATURAL OR ARTIFICIAL OPENING ENDOSCOPIC: ICD-10-PCS | Performed by: INTERNAL MEDICINE

## 2020-10-19 RX ORDER — SODIUM CHLORIDE, SODIUM LACTATE, POTASSIUM CHLORIDE, CALCIUM CHLORIDE 600; 310; 30; 20 MG/100ML; MG/100ML; MG/100ML; MG/100ML
INJECTION, SOLUTION INTRAVENOUS CONTINUOUS
Status: DISCONTINUED | OUTPATIENT
Start: 2020-10-19 | End: 2020-10-19

## 2020-10-19 RX ORDER — NALOXONE HYDROCHLORIDE 0.4 MG/ML
80 INJECTION, SOLUTION INTRAMUSCULAR; INTRAVENOUS; SUBCUTANEOUS AS NEEDED
Status: DISCONTINUED | OUTPATIENT
Start: 2020-10-19 | End: 2020-10-19

## 2020-10-19 RX ORDER — LIDOCAINE HYDROCHLORIDE 10 MG/ML
INJECTION, SOLUTION EPIDURAL; INFILTRATION; INTRACAUDAL; PERINEURAL AS NEEDED
Status: DISCONTINUED | OUTPATIENT
Start: 2020-10-19 | End: 2020-10-19 | Stop reason: SURG

## 2020-10-19 RX ADMIN — LIDOCAINE HYDROCHLORIDE 50 MG: 10 INJECTION, SOLUTION EPIDURAL; INFILTRATION; INTRACAUDAL; PERINEURAL at 15:01:00

## 2020-10-19 RX ADMIN — SODIUM CHLORIDE, SODIUM LACTATE, POTASSIUM CHLORIDE, CALCIUM CHLORIDE: 600; 310; 30; 20 INJECTION, SOLUTION INTRAVENOUS at 15:23:00

## 2020-10-19 NOTE — H&P
[facility] Department of  Gastroenterology  Update Health History :       Fantasma Ngo  male   Sharona Sanders MD     [id number]  10/13/1945 Primary Care Physician  Nickie Sweet MD        HPI :  Pancreas neck cyst that appeared to h [Other]) Brother    • Other (cardiac devices pacemaker present [Other]) Father       Social History    Tobacco Use      Smoking status: Never Smoker      Smokeless tobacco: Never Used    Alcohol use: Yes      Frequency: 4 or more times a week      Drinks p grossly intact      Assessment :    As above. Plan:   Upper endoscopic ultrasound for evaluation.   The risks and benefits of the procedure were discussed in detail with the patient, alternatives and options were all discussed, all questions were answered,

## 2020-10-19 NOTE — ANESTHESIA POSTPROCEDURE EVALUATION
[address] Patient Status:  Hospital Outpatient Surgery   Age/Gender 77year old male MRN [id number]   [facility] ENDOSCOPY Attending Aiyana Zavala MD   Hosp Day # 0 PCP Kuldeep Hernandez MD       Anesthesia P

## 2020-10-19 NOTE — ANESTHESIA PREPROCEDURE EVALUATION
PRE-OP EVALUATION    Patient Name: Antonio Moseley    Pre-op Diagnosis: Pancreas cyst [K86.2]  Dilated pancreatic duct [K86.89]    Procedure(s):  ENDOSCOPIC ULTRASOUND, with Fine Needle Aspiration    Surgeon(s) and Role:     Lela Solis MD - KNEE REPLACEMENT SURGERY Left 2018   • ORAL SURGERY PROCEDURE  3/2000   • OTHER Right     BICEP TENDON REPAIR   • OTHER      orthovisc injections in right knee   • OTHER Left     rotator cuff repair   • OTHER SURGICAL HISTORY  01/08/2020    ROBERT ROBOT ASSIST

## 2020-10-19 NOTE — OPERATIVE REPORT
OPERATIVE REPORT   PATIENT NAME: Deysi Valverde  MRN: [id number]  DATE OF OPERATION: 10/19/2020  PREOPERATIVE DIAGNOSIS:   1. Pancreatic neck cyst most consistent with IPMN.  Cyst has increased slightly in size on follow up MRI   POSTOPERATIVE DIAGNOSE content. IMPRESSION:  1. Findings as described above  RECOMMENDATIONS:  1. We will obtain MRI MRCP in 6 months and continue surveillance.   Sharyn Lee MD

## 2020-10-21 NOTE — LETTER
OFFICE VISIT      Patient: Neil Fields   : 1932 MRN: [id number]    SUBJECTIVE:  Chief Complaint   Patient presents with   • Follow-up       An 87 year old male is here for follow up of multiple medical conditions.    HISTORY OF PRESENT ILLNESS:    Hypertension associated with diabetes : His blood pressure today was 89/52 mmHg.    Pulmonary hypertension: He had a virtual visit with his pulmonologist, Dr. Barlow, and was told that on the basis of his voice and breathing, his pulmonary hypertension and fibrosis was progressing very slowly. Mentions he had severe side effects while he was on Uptravi 600 mcg, so he was asked to discontinue it after 4 days. He later spoke to his pharmacist who sent him a dose of Uptravi 400 mcg and Uptravi 200 mcg, for a months supply, so that he could try the 600 mcg and if he experiences any side effects he can go down to 400 mcg. He has subsequently began taking Uptravi 600 mcg for the past 10 days and the only side effect that has  noticed is the jaw swelling. However, he states he has not felt any other side effect. Reports he has been having difficulty breathing and walking for the past 5 days. He reports feeling down on energy and has been feeling very weak. States he feels dizzy on standing up and when he runs out of breath. Wife mentions that his oxygen levels drop down to 70's if he walks for 20 feet. Mention he has been feeling much better today as compared to two weeks ago. States two weeks ago he felt so worse that he felt as if he should call 911. He has an upcoming office visit appointment with his pulmonologist in 2021. He is currently on Uptravi 600 mcg daily, Opsumit 10 mg daily, Revatio 20 mg t.i.d, Prednisone 10 mg, Albuterol and oxygen 24 hours.    Anxiety associated with depression: Reports he has always been having anxiety. He takes Xanax at bedtime every day.    Flu vaccine need: Due for an influenza vaccine.    PAST MEDICAL HISTORY:  No past  A. Notifier: [employer]   B. Patient Name: Karla Tipton. C. Identification Number: [id number]      Advance Beneficiary Notice of Noncoverage (ABN)  NOTE:  If Medicare doesn’t pay for D. item/service below, you may have to pay.   Medic Medicare. You may ask to be paid now as I am responsible for payment. I cannot appeal if Medicare is not billed. ? OPTION 3. I don’t want the D. item/service listed above.  I understand with this choice  I am not responsible for payment, and I cannot appea medical history on file.  MEDICATIONS:  Current Outpatient Medications   Medication Sig   • Selexipag (Uptravi) 600 MCG Tab    • metFORMIN (GLUCOPHAGE) 500 MG tablet TAKE 2 TABLETS BY MOUTH TWICE DAILY    • simvastatin (ZOCOR) 20 MG tablet TAKE ONE TABLET BY MOUTH EVERY NIGHT AT BEDTIME   • allopurinol (ZYLOPRIM) 100 MG tablet TAKE 1 TABLET BY MOUTH EVERY DAY AS DIRECTED    • escitalopram (LEXAPRO) 20 MG tablet TAKE 1 TABLET BY MOUTH ONCE DAILY   • ALPRAZolam (XANAX) 0.5 MG tablet Take 1 tablet by mouth 3 times daily.   • sildenafil (REVATIO) 20 MG tablet Take 1 tablet by mouth 3 times daily.   • predniSONE (DELTASONE) 10 MG tablet Take 1 tablet by mouth daily.   • macitentan (OPSUMIT) 10 MG tablet Take 1 tablet by mouth daily.   • buPROPion XL (WELLBUTRIN XL) 150 MG 24 hr tablet Take 1 tablet by mouth daily.   • albuterol (VENTOLIN HFA) 108 (90 Base) MCG/ACT inhaler Inhale 2 puffs into the lungs every 4 hours as needed for Shortness of Breath.    • aspirin 81 MG EC tablet TAKE 1 TABLET DAILY     No current facility-administered medications for this visit.      ALLERGIES:  ALLERGIES:  No Known Allergies  PAST SURGICAL HISTORY:  No past surgical history on file.  FAMILY HISTORY:  Family History   Problem Relation Age of Onset   • Congestive Heart Failure Mother    • Cancer Father    • Cancer Sister      SOCIAL HISTORY:  Social History     Tobacco Use   Smoking Status Former Smoker   Smokeless Tobacco Never Used     Social History     Substance and Sexual Activity   Alcohol Use No   • Frequency: Never       Review of Systems    Constitutional: Negative for activity change, appetite change, chills, diaphoresis, fatigue, fever and unexpected weight change.   HENT: Negative for congestion, ear discharge, ear pain, hearing loss, mouth sores, postnasal drip, sinus pressure, sinus pain, sore throat, trouble swallowing and voice change.   Eyes: Negative for photophobia, pain, discharge, redness, itching and visual  disturbance.   Respiratory: Per HPI.  Cardiovascular:  Per HPI.    Gastrointestinal: Negative for abdominal pain, blood in stool, constipation, diarrhea, nausea and vomiting.   Endocrine: Negative for cold intolerance, heat intolerance, polydipsia, polyphagia and polyuria.   Genitourinary: Negative for decreased urine volume, difficulty urinating, dysuria, flank pain, frequency, hematuria and urgency.   Musculoskeletal: Negative for arthralgias, back pain, gait problem, joint swelling, myalgias, neck pain and neck stiffness.   Skin: Negative for rash and wound.   Allergic/Immunologic: Negative for environmental allergies.   Neurological: Negative for dizziness, tremors, syncope, weakness, light-headedness, numbness and headaches.   Hematological: Negative for adenopathy. Does not bruise/bleed easily.   Psychiatric/Behavioral:  Per HPI.        OBJECTIVE:  Vitals:    10/20/20 1110   BP: (!) 89/52   Pulse: 67   Resp: 16   Temp: 97 °F (36.1 °C)   SpO2: 98%   Weight: 69 kg (152 lb 1.9 oz)   Height: 5' 10\" (1.778 m)       Physical Exam    Constitutional: Alert, in no acute distress and current vital signs reviewed.  Head and Face: Atraumatic, no deformities, normocephalic, normal facies.  Eyes: No discharge, normal conjunctiva, no eyelid swelling, no ptosis and the sclerae were normal. Pupils equal, round and reactive to light and accommodation, conjugate gaze and extraocular movements were intact.  ENT: Normal appearing outer ear, normal appearing nose. Examination of the tympanic membrane showed normal landmarks, normal appearing external canal. Nasal mucosa moist and pink, no nasal discharge. Normal lips. Oral mucosa pink and moist, no oral lesions.  Neck: Normal appearing neck, supple neck and no mass was seen. Thyroid not enlarged and no thyroid nodules.  Pulmonary: Tachypnea. Lungs are clear.  Cardiovascular: Tachycardia but regular.  +1/6 systolic ejection murmur.  Abdomen: Soft, nontender, nondistended, normal  bowel sounds and no abdominal mass. No hepatomegaly and no splenomegaly. No umbilical hernia was discovered.  Musculoskeletal: Normal gait. No musculoskeletal erythema was seen, no joint swelling seen and no joint tenderness was elicited. No scoliosis. Normal range of motion. There was no joint instability noted. Muscle strength and tone were normal.  Neurologic: Cranial nerves grossly intact. Normal DTRs. No sensory deficits noted. No coordination deficits. Normal gait. Muscle strength and tone were normal.  Psychiatric: Oriented to person, oriented to place and oriented to time. Alert and awake, interactive and mood/affect were appropriate. Judgement not impaired. Normal attention span. Short term memory intact.  Skin, Hair, Nails: Normal skin color and pigmentation and no rash. No foot ulcers and no skin ulcer was seen. Normal skin turgor. No clubbing or cyanosis of the fingernails.   Blood pressure measured by me today is 110/48 mmHg, after standing up his blood pressure was 100/60 mmHg and pulse was 90 beats/minute. SpO2 was 84%      DIAGNOSTIC STUDIES:  LAB RESULTS:  Lab Services on 10/20/2020   Component Date Value Ref Range Status   • WBC 10/20/2020 9.4  4.2 - 11.0 K/mcL Final   • RBC 10/20/2020 4.71  4.50 - 5.90 mil/mcL Final   • HGB 10/20/2020 10.7* 13.0 - 17.0 g/dL Final   • HCT 10/20/2020 36.0* 39.0 - 51.0 % Final   • MCV 10/20/2020 76.4* 78.0 - 100.0 fl Final   • MCH 10/20/2020 22.7* 26.0 - 34.0 pg Final   • MCHC 10/20/2020 29.7* 32.0 - 36.5 g/dL Final   • RDW-CV 10/20/2020 16.8* 11.0 - 15.0 % Final   • PLT 10/20/2020 307  140 - 450 K/mcL Final   • NRBC 10/20/2020 0  0 /100 WBC Final   • DIFF TYPE 10/20/2020 AUTOMATED DIFFERENTIAL   Final   • Neutrophil 10/20/2020 84  % Final   • LYMPH 10/20/2020 10  % Final   • MONO 10/20/2020 4  % Final   • EOSIN 10/20/2020 1  % Final   • BASO 10/20/2020 0  % Final   • Percent Immature Granuloctyes 10/20/2020 1  % Final   • Absolute Neutrophil 10/20/2020 7.9* 1.8 -  7.7 K/mcL Final   • Absolute Lymph 10/20/2020 1.0  1.0 - 4.0 K/mcL Final   • Absolute Mono 10/20/2020 0.4  0.3 - 0.9 K/mcL Final   • Absolute Eos 10/20/2020 0.1  0.1 - 0.5 K/mcL Final   • Absolute Baso 10/20/2020 0.0  0.0 - 0.3 K/mcL Final   • Absolute Immature Granulocytes 10/20/2020 0.1  0 - 0.2 K/mcl Final   Office Visit on 10/20/2020   Component Date Value Ref Range Status   • MICROALBUMIN, UA (TTL) 10/20/2020 2.31  mg/dL Final   • CREATININE, URINE (TOTAL) 10/20/2020 126.00  mg/dL Final   • MICROALBUMIN/CREATININE 10/20/2020 18.3  <30 mg/g Final       ASSESSMENT AND PLAN:  This is an 87 year old male who presents with :  1. Hypertension associated with diabetes (CMS/HCC)    2. Pulmonary hypertension (CMS/HCC)    3. Orthostatic hypotension    4. Fatigue, unspecified type    5. Anxiety associated with depression    6. Flu vaccine need        Orders Placed This Encounter   • INFLUENZA QUADRIVALENT HIGH DOSE PRES FREE 0.7 ML VACC,IM   • Microalbumin Urine Random   • Comprehensive Metabolic Panel   • CBC with Automated Differential   • Thyroid Stimulating Hormone   • Glycohemoglobin   • Selexipag (Uptravi) 600 MCG Tab       Plan:    Hypertension associated with diabetes (CMS/HCC):  Blood pressure is on the hypotensive side.  Ordered comprehensive metabolic panel.   Ordered glycohemoglobin, microalbumin urine random. Refer to orders.  Check cholesterol.  Continue Metformin as prescribed.    Pulmonary hypertension (CMS/HCC)/ Progressive pulmonary fibrosis:  Severe. Slightly clinically improved on his current dosing of Uptravi 600 mcg daily, Opsumit 10 mg daily, Revatio 20 mg tid, Prednisone 10 mg and Albuterol.  Continue oxygen 24 hours.  Continue follow up with his pulmonary specialist.    Orthostatic hypotension:   Orthostasis was checked in the clinic today, he was actually quite improved.  Encouraged to increase fluids.   Continue to  monitor.    Fatigue, unspecified type:  Likely secondary to the  aforementioned.  Ordered CBC with automated differential. Refer to orders.    Ordered TSH with reflex. Refer to orders.    Anxiety associated with depression:  Well controlled.  Continue present dosing of Escitalopram, Alprazolam and Bupropion.    Flu vaccine need:  Administered high-dose influenza vaccine today.    Follow up in three months.      Refer to orders.  Medical compliance with plan discussed and risks of non-compliance reviewed.  Patient education completed on disease process, etiology & prognosis.  Proper usage and side effects of medications reviewed & discussed.  Patient understands and agrees with the plan.  Return to clinic as clinically indicated as discussed with patient who verbalized understanding of the plan and is in agreement with the plan.    Entered by Dr. Hector Alcantara acting as scribe for Dr. Demario Perry DO

## 2020-10-30 ENCOUNTER — TELEPHONE (OUTPATIENT)
Dept: SURGERY | Facility: CLINIC | Age: 75
End: 2020-10-30

## 2020-10-30 NOTE — TELEPHONE ENCOUNTER
Called patient to confirm appointment on Monday. Reviewed health history with patient. Answered all patient questions.

## 2020-11-02 ENCOUNTER — OFFICE VISIT (OUTPATIENT)
Dept: SURGERY | Facility: CLINIC | Age: 75
End: 2020-11-02
Payer: MEDICARE

## 2020-11-02 VITALS
OXYGEN SATURATION: 97 % | BODY MASS INDEX: 26 KG/M2 | DIASTOLIC BLOOD PRESSURE: 73 MMHG | SYSTOLIC BLOOD PRESSURE: 117 MMHG | WEIGHT: 180.63 LBS | HEART RATE: 70 BPM | RESPIRATION RATE: 16 BRPM

## 2020-11-02 DIAGNOSIS — D49.0 IPMN (INTRADUCTAL PAPILLARY MUCINOUS NEOPLASM): Primary | ICD-10-CM

## 2020-11-02 PROCEDURE — 99205 OFFICE O/P NEW HI 60 MIN: CPT | Performed by: SURGERY

## 2020-11-02 NOTE — CONSULTS
[address] [facility]        Patient Name:  Karl Sanches   YOB: 1945   Gender:  Male   Appt Date:  11/2/2020   Provider:  Patti Lopez MD     PATIENT PROVIDERS  Referring Provider: Merline Gaviria MD  Primary Care P 10/29/2018-> MRI/MRCP revealed the prior noted pancreatic cystic focus is similar to slightly decreased in size and reveals no evidence of enhancement.   This cystic focus however does appear contiguous with the pancreatic duct, which is of normal caliber •  amoxicillin 500 MG Oral Cap, TK 4 CS PO 1 HOUR PRIOR TO APPOINTMENT, Disp: , Rfl:   •  Clobetasol Propionate 0.05 % External Cream, Apply 1 Application topically as needed.   , Disp: , Rfl:      Allergies Reviewed:    Levofloxacin            OTHER (SEE C • Other (anxiety disorder [Other]) Mother    • Other (Parkinson's disease [Other]) Mother    • Other (atrial septal defect [Other]) Brother    • Other (cardiac devices pacemaker present [Other]) Father         Review of Systems:  · GENERAL HEALTH: feels we 1. There is again evidence of a cystic focus at the junction of the pancreatic head/body that has increased in size, currently measuring 1.5 x 1.0 cm. There is no abnormal enhancement of this cystic focus.   There is however suggestion of secondary   parti I was only able to review most recent MRI. Prior imaging are archived and were not readily available during this visit. The entity \"IPMN\" was discussed with them including subtypes. Indications for surgery were discussed.   Patient has mixed type IPMN

## 2020-11-05 ENCOUNTER — TELEPHONE (OUTPATIENT)
Dept: SURGERY | Facility: CLINIC | Age: 75
End: 2020-11-05

## 2020-11-05 NOTE — TELEPHONE ENCOUNTER
Called patient and reviewed tumor board discussion. Recommend repeat MRI/MRCP at 3-6 months. Patient agrees and understands. He also has appointment with Dr. Zulay Mayer for second opinion.

## 2021-03-22 ENCOUNTER — HOSPITAL ENCOUNTER (OUTPATIENT)
Dept: MRI IMAGING | Facility: HOSPITAL | Age: 76
Discharge: HOME | End: 2021-03-22
Attending: INTERNAL MEDICINE
Payer: MEDICARE

## 2021-03-22 DIAGNOSIS — D49.0 IPMN (INTRADUCTAL PAPILLARY MUCINOUS NEOPLASM): ICD-10-CM

## 2021-03-22 PROCEDURE — 76376 3D RENDER W/INTRP POSTPROCES: CPT | Performed by: INTERNAL MEDICINE

## 2021-03-22 PROCEDURE — 74183 MRI ABD W/O CNTR FLWD CNTR: CPT | Performed by: INTERNAL MEDICINE

## 2021-03-22 PROCEDURE — A9575 INJ GADOTERATE MEGLUMI 0.1ML: HCPCS | Performed by: INTERNAL MEDICINE

## 2021-04-01 NOTE — PROGRESS NOTES
Send patient info to Prasad tavera RN for Dr Rincon Said. Have them reach out to patient for OV.  He is aware to obtain CD of MRI images

## 2021-06-17 ENCOUNTER — TELEPHONE (OUTPATIENT)
Dept: FAMILY MEDICINE CLINIC | Facility: CLINIC | Age: 76
End: 2021-06-17

## 2021-06-22 ENCOUNTER — OFFICE VISIT (OUTPATIENT)
Dept: FAMILY MEDICINE CLINIC | Facility: CLINIC | Age: 76
End: 2021-06-22
Payer: MEDICARE

## 2021-06-22 VITALS
SYSTOLIC BLOOD PRESSURE: 122 MMHG | RESPIRATION RATE: 16 BRPM | OXYGEN SATURATION: 98 % | DIASTOLIC BLOOD PRESSURE: 68 MMHG | WEIGHT: 194.19 LBS | HEIGHT: 69.5 IN | BODY MASS INDEX: 28.11 KG/M2 | HEART RATE: 72 BPM | TEMPERATURE: 98 F

## 2021-06-22 DIAGNOSIS — Z13.6 SCREENING FOR CARDIOVASCULAR CONDITION: ICD-10-CM

## 2021-06-22 DIAGNOSIS — M17.11 ARTHRITIS OF RIGHT KNEE: ICD-10-CM

## 2021-06-22 DIAGNOSIS — M62.838 MUSCLE SPASMS OF LOWER EXTREMITY, UNSPECIFIED LATERALITY: ICD-10-CM

## 2021-06-22 DIAGNOSIS — D49.0 IPMN (INTRADUCTAL PAPILLARY MUCINOUS NEOPLASM): ICD-10-CM

## 2021-06-22 DIAGNOSIS — Z13.31 DEPRESSION SCREENING: ICD-10-CM

## 2021-06-22 DIAGNOSIS — Z00.00 ENCOUNTER FOR ANNUAL HEALTH EXAMINATION: Primary | ICD-10-CM

## 2021-06-22 DIAGNOSIS — Z12.5 SCREENING FOR MALIGNANT NEOPLASM OF PROSTATE: ICD-10-CM

## 2021-06-22 PROCEDURE — G0444 DEPRESSION SCREEN ANNUAL: HCPCS | Performed by: FAMILY MEDICINE

## 2021-06-22 PROCEDURE — G0439 PPPS, SUBSEQ VISIT: HCPCS | Performed by: FAMILY MEDICINE

## 2021-06-22 NOTE — PATIENT INSTRUCTIONS
Job Serum Radha's SCREENING SCHEDULE   Tests on this list are recommended by your physician but may not be covered, or covered at this frequency, by your insurer. Please check with your insurance carrier before scheduling to verify coverage. vaccine (Tasrdrs13 & [id number]) covered once after 65 Prevnar 13: 06/10/2016    Fxarhyiev03: 06/13/2017     No recommendations at this time    Hepatitis B One screening covered for patients with certain risk factors   -  No recommendations at this time

## 2021-06-22 NOTE — PROGRESS NOTES
HPI:   Jarrett Bryant is a 77year old male who presents for a Medicare Subsequent Annual Wellness visit (Pt already had Initial Annual Wellness). Preventative Screening  Colonoscopy - 7/2016. Advised to repeat 5 yrs - around now. He is aware. and discussion of advance directives standard forms performed Face to Face with patient and Family/surrogate (if present), and forms available to patient in AVS     He does NOT have a Power of  for [employer] on file in [address] [place] [address] Rd.    Advance care Marcelina Van allergic to levofloxacin, norco [hydrocodone-acetaminophen], sulfa antibiotics, and other. CURRENT MEDICATIONS:   Calcium Polycarbophil (FIBER) 625 MG Oral Tab, Take 6 capsules by mouth daily.   Vitamin D3 50 MCG (2000 UT) Oral Cap, Take 2,000 Units by m m)   Wt 194 lb 3.2 oz (88.1 kg)   SpO2 98%   BMI 28.27 kg/m²   Estimated body mass index is 28.27 kg/m² as calculated from the following:    Height as of this encounter: 5' 9.5\" (1.765 m). Weight as of this encounter: 194 lb 3.2 oz (88.1 kg).     Medica • Pneumovax 23 06/13/2017   • TD 07/17/2008   • TDAP 05/01/2016   • Zoster Vaccine Live (Zostavax) 09/01/2013   • Zoster Vaccine Recombinant Adjuvanted (Shingrix) 06/16/2020, 08/17/2020        ASSESSMENT AND OTHER RELEVANT CHRONIC CONDITIONS:   Beverely Bring General Health     In the past six months, have you lost more than 10 pounds without trying?: 2 - No  Has your appetite been poor?: No  How does the patient maintain a good energy level?: Appropriate Exercise  How would you describe your daily physical previous colonoscopy was abnormal 07/01/2016    Colonoscopy due on 07/01/2021    Flexible Sigmoidoscopy   Covered every 4 years -    Fecal Occult Blood Test Covered annually -   Prostate Cancer Screening    Prostate-Specific Antigen (PSA) Annually No resul

## 2021-11-09 PROBLEM — D69.2 PIGMENTED PURPURA (HCC): Status: ACTIVE | Noted: 2021-10-26

## 2021-11-09 PROBLEM — D69.2 PIGMENTED PURPURA: Status: ACTIVE | Noted: 2021-10-26

## 2021-11-24 RX ORDER — TAMSULOSIN HYDROCHLORIDE 0.4 MG/1
0.4 CAPSULE ORAL DAILY
Qty: 90 CAPSULE | Refills: 2 | Status: SHIPPED | OUTPATIENT
Start: 2021-11-24 | End: 2022-09-06

## 2021-11-26 RX ORDER — TAMSULOSIN HYDROCHLORIDE 0.4 MG/1
0.4 CAPSULE ORAL DAILY
Qty: 90 CAPSULE | Refills: 2 | OUTPATIENT
Start: 2021-11-26

## 2021-11-26 NOTE — TELEPHONE ENCOUNTER
Refill request for:    Requested Prescriptions     Pending Prescriptions Disp Refills   • tamsulosin (FLOMAX) cap 90 capsule 2     Sig: Take 1 capsule (0.4 mg total) by mouth daily.         Last Prescribed Quantity Refills   11/324/2021 90 2     LOV 6/22/20

## 2021-12-17 ENCOUNTER — TELEPHONE (OUTPATIENT)
Dept: FAMILY MEDICINE CLINIC | Facility: CLINIC | Age: 76
End: 2021-12-17

## 2021-12-17 NOTE — TELEPHONE ENCOUNTER
No labs were requested yet we will wait for request from Preadmission testing as to which labs they want

## 2021-12-17 NOTE — TELEPHONE ENCOUNTER
Received fax from Rosa Lira MD. Patient having Total Knee Arthroplasty with YUSRA 03/02/22 H&P required. Patient to call Pre-Admissions to arrange any testing that may be required, nothing specified on fax.  Patient scheduled with Dr. Whit Lyle 02/21/22

## 2022-02-21 ENCOUNTER — OFFICE VISIT (OUTPATIENT)
Dept: FAMILY MEDICINE CLINIC | Facility: CLINIC | Age: 77
End: 2022-02-21
Payer: MEDICARE

## 2022-02-21 VITALS
HEIGHT: 70 IN | BODY MASS INDEX: 26.77 KG/M2 | WEIGHT: 187 LBS | SYSTOLIC BLOOD PRESSURE: 120 MMHG | DIASTOLIC BLOOD PRESSURE: 70 MMHG | TEMPERATURE: 97 F | RESPIRATION RATE: 16 BRPM | HEART RATE: 72 BPM | OXYGEN SATURATION: 99 %

## 2022-02-21 DIAGNOSIS — M17.11 PRIMARY OSTEOARTHRITIS OF RIGHT KNEE: ICD-10-CM

## 2022-02-21 DIAGNOSIS — R73.9 HYPERGLYCEMIA: ICD-10-CM

## 2022-02-21 DIAGNOSIS — Z96.652 HISTORY OF LEFT KNEE REPLACEMENT: ICD-10-CM

## 2022-02-21 DIAGNOSIS — Z96.641 STATUS POST RIGHT HIP REPLACEMENT: ICD-10-CM

## 2022-02-21 DIAGNOSIS — Z01.818 PREOP EXAMINATION: Primary | ICD-10-CM

## 2022-02-21 DIAGNOSIS — K21.00 GASTROESOPHAGEAL REFLUX DISEASE WITH ESOPHAGITIS WITHOUT HEMORRHAGE: ICD-10-CM

## 2022-02-21 PROCEDURE — 99214 OFFICE O/P EST MOD 30 MIN: CPT | Performed by: FAMILY MEDICINE

## 2022-06-21 ENCOUNTER — TELEPHONE (OUTPATIENT)
Dept: FAMILY MEDICINE CLINIC | Facility: CLINIC | Age: 77
End: 2022-06-21

## 2022-06-21 DIAGNOSIS — R82.90 ABNORMAL URINALYSIS: ICD-10-CM

## 2022-06-21 DIAGNOSIS — R73.9 HYPERGLYCEMIA: Primary | ICD-10-CM

## 2022-06-21 DIAGNOSIS — Z13.6 SCREENING FOR CARDIOVASCULAR CONDITION: ICD-10-CM

## 2022-06-21 DIAGNOSIS — Z12.5 SCREENING FOR MALIGNANT NEOPLASM OF PROSTATE: ICD-10-CM

## 2022-06-21 NOTE — TELEPHONE ENCOUNTER
Please also include PSA    Please enter lab orders for the patient's upcoming physical appointment. Physical scheduled: Your appointments     Date & Time Appointment Department [facility])    Jul 19, 2022 10:30 AM CDT Medicare [facility] [address], [place]  ([facility])            [facility] [address], [place] [address] [place] [address] [phone]         [facility])     The patient has been notified to complete fasting labs prior to their physical appointment.

## 2022-06-30 ENCOUNTER — LAB ENCOUNTER (OUTPATIENT)
Dept: LAB | Age: 77
End: 2022-06-30
Attending: FAMILY MEDICINE
Payer: MEDICARE

## 2022-06-30 DIAGNOSIS — Z13.6 SCREENING FOR CARDIOVASCULAR CONDITION: ICD-10-CM

## 2022-06-30 DIAGNOSIS — R73.9 HYPERGLYCEMIA: ICD-10-CM

## 2022-06-30 DIAGNOSIS — R82.90 ABNORMAL URINALYSIS: ICD-10-CM

## 2022-06-30 DIAGNOSIS — Z12.5 SCREENING FOR MALIGNANT NEOPLASM OF PROSTATE: ICD-10-CM

## 2022-06-30 LAB
ALBUMIN SERPL-MCNC: 4.1 G/DL (ref 3.4–5)
ALBUMIN/GLOB SERPL: 1.4 {RATIO} (ref 1–2)
ALP LIVER SERPL-CCNC: 61 U/L
ALT SERPL-CCNC: 29 U/L
ANION GAP SERPL CALC-SCNC: 5 MMOL/L (ref 0–18)
AST SERPL-CCNC: 25 U/L (ref 15–37)
BILIRUB SERPL-MCNC: 0.5 MG/DL (ref 0.1–2)
BILIRUB UR QL STRIP.AUTO: NEGATIVE
BUN BLD-MCNC: 18 MG/DL (ref 7–18)
CALCIUM BLD-MCNC: 9.5 MG/DL (ref 8.5–10.1)
CHLORIDE SERPL-SCNC: 107 MMOL/L (ref 98–112)
CHOLEST SERPL-MCNC: 202 MG/DL (ref ?–200)
CLARITY UR REFRACT.AUTO: CLEAR
CO2 SERPL-SCNC: 27 MMOL/L (ref 21–32)
COMPLEXED PSA SERPL-MCNC: 2.66 NG/ML (ref ?–4)
CREAT BLD-MCNC: 1.05 MG/DL
EST. AVERAGE GLUCOSE BLD GHB EST-MCNC: 123 MG/DL (ref 68–126)
FASTING PATIENT LIPID ANSWER: YES
FASTING STATUS PATIENT QL REPORTED: YES
GLOBULIN PLAS-MCNC: 3 G/DL (ref 2.8–4.4)
GLUCOSE BLD-MCNC: 107 MG/DL (ref 70–99)
GLUCOSE UR STRIP.AUTO-MCNC: NEGATIVE MG/DL
HBA1C MFR BLD: 5.9 % (ref ?–5.7)
HDLC SERPL-MCNC: 46 MG/DL (ref 40–59)
KETONES UR STRIP.AUTO-MCNC: NEGATIVE MG/DL
LDLC SERPL CALC-MCNC: 136 MG/DL (ref ?–100)
LEUKOCYTE ESTERASE UR QL STRIP.AUTO: NEGATIVE
NITRITE UR QL STRIP.AUTO: NEGATIVE
NONHDLC SERPL-MCNC: 156 MG/DL (ref ?–130)
OSMOLALITY SERPL CALC.SUM OF ELEC: 290 MOSM/KG (ref 275–295)
PH UR STRIP.AUTO: 6 [PH] (ref 5–8)
POTASSIUM SERPL-SCNC: 4.4 MMOL/L (ref 3.5–5.1)
PROT SERPL-MCNC: 7.1 G/DL (ref 6.4–8.2)
PROT UR STRIP.AUTO-MCNC: NEGATIVE MG/DL
RBC UR QL AUTO: NEGATIVE
SODIUM SERPL-SCNC: 139 MMOL/L (ref 136–145)
SP GR UR STRIP.AUTO: 1.01 (ref 1–1.03)
TRIGL SERPL-MCNC: 109 MG/DL (ref 30–149)
UROBILINOGEN UR STRIP.AUTO-MCNC: <2 MG/DL
VLDLC SERPL CALC-MCNC: 20 MG/DL (ref 0–30)

## 2022-06-30 PROCEDURE — 81003 URINALYSIS AUTO W/O SCOPE: CPT

## 2022-06-30 PROCEDURE — 80053 COMPREHEN METABOLIC PANEL: CPT

## 2022-06-30 PROCEDURE — 36415 COLL VENOUS BLD VENIPUNCTURE: CPT

## 2022-06-30 PROCEDURE — 80061 LIPID PANEL: CPT

## 2022-06-30 PROCEDURE — 83036 HEMOGLOBIN GLYCOSYLATED A1C: CPT

## 2022-07-19 ENCOUNTER — OFFICE VISIT (OUTPATIENT)
Dept: FAMILY MEDICINE CLINIC | Facility: CLINIC | Age: 77
End: 2022-07-19
Payer: MEDICARE

## 2022-07-19 VITALS
SYSTOLIC BLOOD PRESSURE: 118 MMHG | RESPIRATION RATE: 18 BRPM | HEART RATE: 72 BPM | BODY MASS INDEX: 27.43 KG/M2 | DIASTOLIC BLOOD PRESSURE: 68 MMHG | WEIGHT: 185.19 LBS | HEIGHT: 69 IN

## 2022-07-19 DIAGNOSIS — Z90.49 S/P COLON RESECTION: ICD-10-CM

## 2022-07-19 DIAGNOSIS — Z96.641 STATUS POST RIGHT HIP REPLACEMENT: ICD-10-CM

## 2022-07-19 DIAGNOSIS — Z13.31 DEPRESSION SCREENING: ICD-10-CM

## 2022-07-19 DIAGNOSIS — Z96.652 HISTORY OF LEFT KNEE REPLACEMENT: ICD-10-CM

## 2022-07-19 DIAGNOSIS — M17.11 PRIMARY OSTEOARTHRITIS OF RIGHT KNEE: ICD-10-CM

## 2022-07-19 DIAGNOSIS — R73.9 HYPERGLYCEMIA: ICD-10-CM

## 2022-07-19 DIAGNOSIS — I77.810 DILATATION OF THORACIC AORTA (HCC): ICD-10-CM

## 2022-07-19 DIAGNOSIS — Z00.00 ENCOUNTER FOR ANNUAL HEALTH EXAMINATION: Primary | ICD-10-CM

## 2022-07-19 DIAGNOSIS — K86.2 PANCREATIC CYST: ICD-10-CM

## 2022-07-19 PROBLEM — D69.2 PIGMENTED PURPURA: Status: RESOLVED | Noted: 2021-10-26 | Resolved: 2022-07-19

## 2022-07-19 PROBLEM — D69.2 PIGMENTED PURPURA (HCC): Status: RESOLVED | Noted: 2021-10-26 | Resolved: 2022-07-19

## 2022-07-19 PROCEDURE — G0444 DEPRESSION SCREEN ANNUAL: HCPCS | Performed by: FAMILY MEDICINE

## 2022-07-19 PROCEDURE — G0439 PPPS, SUBSEQ VISIT: HCPCS | Performed by: FAMILY MEDICINE

## 2022-07-19 PROCEDURE — 1125F AMNT PAIN NOTED PAIN PRSNT: CPT | Performed by: FAMILY MEDICINE

## 2022-07-19 RX ORDER — AMOXICILLIN 500 MG/1
CAPSULE ORAL 3 TIMES DAILY
COMMUNITY
Start: 2022-06-27

## 2022-07-19 RX ORDER — TRIAMCINOLONE ACETONIDE 1 MG/G
CREAM TOPICAL
COMMUNITY
Start: 2022-05-12

## 2022-09-06 RX ORDER — TAMSULOSIN HYDROCHLORIDE 0.4 MG/1
0.4 CAPSULE ORAL DAILY
Qty: 90 CAPSULE | Refills: 3 | Status: SHIPPED | OUTPATIENT
Start: 2022-09-06 | End: 2023-08-31

## 2022-09-11 ENCOUNTER — PATIENT MESSAGE (OUTPATIENT)
Dept: FAMILY MEDICINE CLINIC | Facility: CLINIC | Age: 77
End: 2022-09-11

## 2022-09-12 NOTE — TELEPHONE ENCOUNTER
From: Rodolfo Stevens  To: Jyoti Murrell MD  Sent: 9/11/2022 11:33 AM CDT  Subject: Pneumonia vaccine     Am I up to date for my pneumonia immunization? I have an opportunity to get a free pneumonia shot on Tuesday, September 13th. My last pneumonia shot was in 2017.

## 2023-02-09 NOTE — ED NOTES
Continue statin and Coumadin  INR is 2- continue to monitor     Patient updated on POC. Wife at bedside.

## 2023-06-01 ENCOUNTER — TELEPHONE (OUTPATIENT)
Dept: FAMILY MEDICINE CLINIC | Facility: CLINIC | Age: 78
End: 2023-06-01

## 2023-06-12 ENCOUNTER — TELEPHONE (OUTPATIENT)
Dept: FAMILY MEDICINE CLINIC | Facility: CLINIC | Age: 78
End: 2023-06-12

## 2023-06-12 DIAGNOSIS — R73.9 HYPERGLYCEMIA: Primary | ICD-10-CM

## 2023-06-12 DIAGNOSIS — Z12.5 SCREENING FOR MALIGNANT NEOPLASM OF PROSTATE: ICD-10-CM

## 2023-06-12 DIAGNOSIS — Z13.6 SCREENING FOR CARDIOVASCULAR CONDITION: ICD-10-CM

## 2023-06-12 NOTE — TELEPHONE ENCOUNTER
Please enter lab orders for the patient's upcoming physical appointment. Physical scheduled: Your appointments     Date & Time Appointment Department [facility])    Jul 24, 2023  1:30 PM CDT Medicare Annual Well Visit with MD Natty Dorado, [address], [place] ([address])            Natty Shane, [address], [address] [place] [address] [place] [address] [phone]         [facility])     The patient has been notified to complete fasting labs prior to their physical appointment.

## 2023-06-19 ENCOUNTER — LAB ENCOUNTER (OUTPATIENT)
Dept: LAB | Age: 78
End: 2023-06-19
Attending: FAMILY MEDICINE
Payer: MEDICARE

## 2023-06-19 DIAGNOSIS — R73.9 HYPERGLYCEMIA: ICD-10-CM

## 2023-06-19 DIAGNOSIS — Z13.6 SCREENING FOR CARDIOVASCULAR CONDITION: ICD-10-CM

## 2023-06-19 DIAGNOSIS — Z12.5 SCREENING FOR MALIGNANT NEOPLASM OF PROSTATE: ICD-10-CM

## 2023-06-19 LAB
ALBUMIN SERPL-MCNC: 4 G/DL (ref 3.4–5)
ALBUMIN/GLOB SERPL: 1.3 {RATIO} (ref 1–2)
ALP LIVER SERPL-CCNC: 60 U/L
ALT SERPL-CCNC: 23 U/L
ANION GAP SERPL CALC-SCNC: 6 MMOL/L (ref 0–18)
AST SERPL-CCNC: 16 U/L (ref 15–37)
BILIRUB SERPL-MCNC: 0.7 MG/DL (ref 0.1–2)
BUN BLD-MCNC: 20 MG/DL (ref 7–18)
CALCIUM BLD-MCNC: 9.4 MG/DL (ref 8.5–10.1)
CHLORIDE SERPL-SCNC: 106 MMOL/L (ref 98–112)
CHOLEST SERPL-MCNC: 200 MG/DL (ref ?–200)
CO2 SERPL-SCNC: 27 MMOL/L (ref 21–32)
COMPLEXED PSA SERPL-MCNC: 3.14 NG/ML (ref ?–4)
CREAT BLD-MCNC: 1 MG/DL
EST. AVERAGE GLUCOSE BLD GHB EST-MCNC: 120 MG/DL (ref 68–126)
FASTING PATIENT LIPID ANSWER: YES
FASTING STATUS PATIENT QL REPORTED: YES
GFR SERPLBLD BASED ON 1.73 SQ M-ARVRAT: 78 ML/MIN/1.73M2 (ref 60–?)
GLOBULIN PLAS-MCNC: 3 G/DL (ref 2.8–4.4)
GLUCOSE BLD-MCNC: 101 MG/DL (ref 70–99)
HBA1C MFR BLD: 5.8 % (ref ?–5.7)
HDLC SERPL-MCNC: 51 MG/DL (ref 40–59)
LDLC SERPL CALC-MCNC: 135 MG/DL (ref ?–100)
NONHDLC SERPL-MCNC: 149 MG/DL (ref ?–130)
OSMOLALITY SERPL CALC.SUM OF ELEC: 291 MOSM/KG (ref 275–295)
POTASSIUM SERPL-SCNC: 4.4 MMOL/L (ref 3.5–5.1)
PROT SERPL-MCNC: 7 G/DL (ref 6.4–8.2)
SODIUM SERPL-SCNC: 139 MMOL/L (ref 136–145)
TRIGL SERPL-MCNC: 75 MG/DL (ref 30–149)
VLDLC SERPL CALC-MCNC: 14 MG/DL (ref 0–30)

## 2023-06-19 PROCEDURE — 80053 COMPREHEN METABOLIC PANEL: CPT

## 2023-06-19 PROCEDURE — 80061 LIPID PANEL: CPT

## 2023-06-19 PROCEDURE — 83036 HEMOGLOBIN GLYCOSYLATED A1C: CPT

## 2023-06-19 PROCEDURE — 36415 COLL VENOUS BLD VENIPUNCTURE: CPT

## 2023-06-24 ENCOUNTER — TELEPHONE (OUTPATIENT)
Dept: FAMILY MEDICINE CLINIC | Facility: CLINIC | Age: 78
End: 2023-06-24

## 2023-06-27 ENCOUNTER — OFFICE VISIT (OUTPATIENT)
Dept: FAMILY MEDICINE CLINIC | Facility: CLINIC | Age: 78
End: 2023-06-27
Payer: MEDICARE

## 2023-06-27 VITALS
SYSTOLIC BLOOD PRESSURE: 112 MMHG | HEART RATE: 76 BPM | HEIGHT: 69.49 IN | WEIGHT: 178.25 LBS | BODY MASS INDEX: 25.81 KG/M2 | DIASTOLIC BLOOD PRESSURE: 68 MMHG

## 2023-06-27 DIAGNOSIS — I77.810 DILATATION OF THORACIC AORTA (HCC): ICD-10-CM

## 2023-06-27 DIAGNOSIS — Z71.84 TRAVEL ADVICE ENCOUNTER: ICD-10-CM

## 2023-06-27 DIAGNOSIS — M54.32 LEFT SIDED SCIATICA: ICD-10-CM

## 2023-06-27 DIAGNOSIS — R73.9 HYPERGLYCEMIA: ICD-10-CM

## 2023-06-27 DIAGNOSIS — Z00.00 ENCOUNTER FOR ANNUAL HEALTH EXAMINATION: Primary | ICD-10-CM

## 2023-06-27 DIAGNOSIS — K86.2 PANCREATIC CYST: ICD-10-CM

## 2023-06-27 PROCEDURE — 99213 OFFICE O/P EST LOW 20 MIN: CPT | Performed by: FAMILY MEDICINE

## 2023-06-27 PROCEDURE — 1125F AMNT PAIN NOTED PAIN PRSNT: CPT | Performed by: FAMILY MEDICINE

## 2023-06-27 PROCEDURE — G0439 PPPS, SUBSEQ VISIT: HCPCS | Performed by: FAMILY MEDICINE

## 2023-07-31 ENCOUNTER — HOSPITAL ENCOUNTER (OUTPATIENT)
Age: 78
Discharge: HOME | End: 2023-07-31
Attending: EMERGENCY MEDICINE
Payer: MEDICARE

## 2023-07-31 ENCOUNTER — APPOINTMENT (OUTPATIENT)
Dept: CT IMAGING | Age: 78
End: 2023-07-31
Attending: EMERGENCY MEDICINE
Payer: MEDICARE

## 2023-07-31 VITALS
SYSTOLIC BLOOD PRESSURE: 155 MMHG | HEART RATE: 76 BPM | TEMPERATURE: 98 F | OXYGEN SATURATION: 96 % | RESPIRATION RATE: 18 BRPM | BODY MASS INDEX: 25.77 KG/M2 | HEIGHT: 70 IN | DIASTOLIC BLOOD PRESSURE: 69 MMHG | WEIGHT: 180 LBS

## 2023-07-31 DIAGNOSIS — S00.03XA CONTUSION OF SCALP, INITIAL ENCOUNTER: Primary | ICD-10-CM

## 2023-07-31 PROCEDURE — 90471 IMMUNIZATION ADMIN: CPT

## 2023-07-31 PROCEDURE — 99214 OFFICE O/P EST MOD 30 MIN: CPT

## 2023-07-31 PROCEDURE — 70450 CT HEAD/BRAIN W/O DYE: CPT | Performed by: EMERGENCY MEDICINE

## 2023-07-31 PROCEDURE — 99203 OFFICE O/P NEW LOW 30 MIN: CPT

## 2023-07-31 NOTE — ED INITIAL ASSESSMENT (HPI)
Patient states on 7/28 around 3pm he fell when he was at a swimming pool in [place]. States he hit some wet concrete and fell onto his back, hitting the shoulders and posterior head. States his shoulders are still sore but still has full ROM of both arms. Has some abrasions to the back of the head. Denies any LOC, vision changes, headaches, and is not on any blood thinners.

## 2023-07-31 NOTE — DISCHARGE INSTRUCTIONS
Tylenol for discomfort if it occurs. Follow-up with your PCP this week. Go to the emergency room symptoms worsen or new symptoms develop as discussed. Surya Carranza

## 2023-08-03 NOTE — IMAGING NOTE
Call placed to pt regarding CTA Gated Thoracic. Instructed to arrive at 9:15. May eat a light breakfast/lunch but drink plenty of fluids a day before and morning of procedure. .   Advised to hold caffeine 12 hrs prior to procedure. May take usual meds.

## 2023-08-07 ENCOUNTER — HOSPITAL ENCOUNTER (OUTPATIENT)
Dept: CT IMAGING | Facility: HOSPITAL | Age: 78
Discharge: HOME | End: 2023-08-07
Attending: FAMILY MEDICINE
Payer: MEDICARE

## 2023-08-07 VITALS — SYSTOLIC BLOOD PRESSURE: 123 MMHG | DIASTOLIC BLOOD PRESSURE: 72 MMHG | HEART RATE: 68 BPM

## 2023-08-07 DIAGNOSIS — I77.810 DILATATION OF THORACIC AORTA (HCC): ICD-10-CM

## 2023-08-07 LAB
CREAT BLD-MCNC: 0.9 MG/DL
EGFRCR SERPLBLD CKD-EPI 2021: 89 ML/MIN/1.73M2 (ref 60–?)

## 2023-08-07 PROCEDURE — 71275 CT ANGIOGRAPHY CHEST: CPT | Performed by: FAMILY MEDICINE

## 2023-08-07 PROCEDURE — 82565 ASSAY OF CREATININE: CPT

## 2023-08-07 NOTE — IMAGING NOTE
Bradford Velasco. to CT Rm 4. Positioned pt on table. Procedure explained and questions answered. Vital signs monitored and noted in Flowsheet. GFR = 89  Contrast injected followed by saline flush at 0946  Contrast = 65ml  0.9 NS flush = 63ml  Average HR = 65    Patient tolerated the procedure without complication. Denies any contrast reaction. Discontinued IV saline lock. Escorted pt to Dressing Room and discharged in stable condition.

## 2023-08-31 RX ORDER — TAMSULOSIN HYDROCHLORIDE 0.4 MG/1
0.4 CAPSULE ORAL DAILY
Qty: 90 CAPSULE | Refills: 3 | Status: SHIPPED | OUTPATIENT
Start: 2023-08-31

## 2024-04-08 ENCOUNTER — HOSPITAL ENCOUNTER (OUTPATIENT)
Age: 79
Discharge: HOME | End: 2024-04-08
Attending: EMERGENCY MEDICINE
Payer: MEDICARE

## 2024-04-08 VITALS
BODY MASS INDEX: 25.91 KG/M2 | SYSTOLIC BLOOD PRESSURE: 133 MMHG | HEIGHT: 70.5 IN | TEMPERATURE: 98 F | RESPIRATION RATE: 18 BRPM | OXYGEN SATURATION: 98 % | WEIGHT: 183 LBS | DIASTOLIC BLOOD PRESSURE: 63 MMHG | HEART RATE: 65 BPM

## 2024-04-08 DIAGNOSIS — R42 VERTIGO: Primary | ICD-10-CM

## 2024-04-08 PROCEDURE — 99213 OFFICE O/P EST LOW 20 MIN: CPT

## 2024-04-08 PROCEDURE — 99214 OFFICE O/P EST MOD 30 MIN: CPT

## 2024-04-08 RX ORDER — MECLIZINE HYDROCHLORIDE 25 MG/1
25 TABLET ORAL 3 TIMES DAILY PRN
Qty: 20 TABLET | Refills: 0 | Status: SHIPPED | OUTPATIENT
Start: 2024-04-08

## 2024-04-08 RX ORDER — ONDANSETRON 4 MG/1
4 TABLET, ORALLY DISINTEGRATING ORAL EVERY 4 HOURS PRN
Qty: 10 TABLET | Refills: 0 | Status: SHIPPED | OUTPATIENT
Start: 2024-04-08 | End: 2024-04-15

## 2024-04-08 NOTE — ED PROVIDER NOTES
Patient Seen in: [facility]      History     Chief Complaint   Patient presents with    Dizziness    Sinus Problem     Stated Complaint: sinus congestion, dizziness    Subjective:   HPI    78-year-old male presents to the immediate care for complaints of feeling of disequilibrium.  Patient states that he noticed last week that when he stood up too quickly or when he bent down that he would have a feeling of dizziness as though his balance was off.  Patient had some sinus congestion today but denies fevers or chills.  Denies any rhinorrhea cough or nasal drainage.  He had some left-sided ear discomfort.  He was concerned because he is having dental implants performed tomorrow.  He denies any facial droop or slurred speech.  Denies any occipital headache or neck pain.  Denies any focal motor weakness, numbness, tingling.  Denies any difficulty with gait at this time.  Patient states that his symptoms are intermittent.    Objective:   Past Medical History:   Diagnosis Date    Cataract     right    Disorders of bursae and tendons in shoulder region, unspecified     Diverticulosis of colon 06/29/2012    Lumbar herniated disc     Osteoarthritis     Pancreatic abnormality     small cyst on the pancrease    Secondary cataract of right eye     per Dr Seals 12/19/19    Sleep disturbance, unspecified 06/29/2012    Visual impairment     glasses              Past Surgical History:   Procedure Laterality Date    ADENOIDECTOMY      ANESTH,BICEPS TENDON REPAIR      APPENDECTOMY  1969    CARPAL TUNNEL RELEASE      CATARACT      COLONOSCOPY  2006    prev.2002    CORTISONE INJECTION  2/1/2017    both shoulder    HIP REPLACEMENT SURGERY Right 02/10/2015    KNEE REPLACEMENT SURGERY Left 2018    ORAL SURGERY PROCEDURE  3/2000    OTHER Right     BICEP TENDON REPAIR    OTHER      orthovisc injections in right knee    OTHER Left     rotator cuff repair    OTHER SURGICAL HISTORY  01/08/2020    XI ROBOT ASSISTED LOW ANTERIOR  COLON RESECTION, RIGID PROCTOSCOPY mobilization of the splenic flexure    TONSILLECTOMY      UPPER GI ENDOSCOPY,EXAM                  Social History     Socioeconomic History    Marital status:    Occupational History    Occupation: Retired   Tobacco Use    Smoking status: Never    Smokeless tobacco: Never   Vaping Use    Vaping Use: Never used   Substance and Sexual Activity    Alcohol use: Yes     Comment: 1/2 glass of wine 4 days a week     Drug use: No   Other Topics Concern    Caffeine Concern Yes     Comment: 2 cups coffee/tea daily    Exercise Yes     Comment: Stationary biking daily     Seat Belt Yes              Review of Systems    Positive for stated complaint: sinus congestion, dizziness  Other systems are as noted in HPI.  Constitutional and vital signs reviewed.      All other systems reviewed and negative except as noted above.    Physical Exam     ED Triage Vitals [04/08/24 0834]   /63   Pulse 65   Resp 18   Temp 97.8 °F (36.6 °C)   Temp src Temporal   SpO2 98 %   O2 Device None (Room air)       Current:/63   Pulse 65   Temp 97.8 °F (36.6 °C) (Temporal)   Resp 18   Ht 179.1 cm (5' 10.5\")   Wt 83 kg   SpO2 98%   BMI 25.89 kg/m²         Physical Exam    General: Alert and oriented. No acute distress.  HEENT: Normocephalic. No evidence of trauma. Extraocular movements are intact.  No evidence of vertical or rotatory nystagmus.  There is no vertical nystagmus.  Cardiovascular exam: Regular rate and rhythm  Lungs: Clear to auscultation bilaterally.  Abdomen: Soft, nondistended, nontender.  Extremities: No evidence of deformity. No clubbing or cyanosis.  Neuro: No focal deficit is noted.  Cranial nerves II to XII are intact.  Motor strength is 5/5 to both upper and lower extremities.  No evidence of ataxia with finger-nose testing.  Patient shows evidence of normal gait.    ED Course   Labs Reviewed - No data to display  Patient's clinical history and presentation appears to be  consistent with peripheral vertigo.  He does not have any focal neurologic deficits.  Denies any difficulty with swallowing or speech.  No evidence of any vertical nystagmus.  Patient will be discharged home with meclizine and Zofran.  Recommend follow-up with his primary care doctor.  Patient struck to return for any worsening symptoms or new concerns.         MDM   Patient was screened and evaluated during this visit.   As a treating physician attending to the patient, I determined, within reasonable clinical confidence and prior to discharge, that an emergency medical condition was not or was no longer present.  There was no indication for further evaluation, treatment or admission on an emergency basis.  Comprehensive verbal and written discharge and follow-up instructions were provided to help prevent relapse or worsening.  Patient was instructed to follow-up with her primary care provider for further evaluation and treatment, but to return immediately to the ER for worsening, concerning, new, changing or persisting symptoms.  I discussed the case with the patient and they had no questions, complaints, or concerns.  Patient felt comfortable going home.    ^^Please note that this report has been produced using speech recognition software and may contain errors related to that system including, but not limited to, errors in grammar, punctuation, and spelling, as well as words and phrases that possibly may have been recognized inappropriately.  If there are any questions or concerns, contact the dictating provider for clarification                                   Medical Decision Making      Disposition and Plan     Clinical Impression:  1. Vertigo         Disposition:  Discharge  4/8/2024  9:00 am    Follow-up:  Jayro Abernathy MD  [address]   [facility] [id number]  [phone]    Call   As needed, If symptoms worsen          Medications Prescribed:  Current Discharge Medication List        START  taking these medications    Details   meclizine 25 MG Oral Tab Take 1 tablet (25 mg total) by mouth 3 (three) times daily as needed.  Qty: 20 tablet, Refills: 0      ondansetron 4 MG Oral Tablet Dispersible Take 1 tablet (4 mg total) by mouth every 4 (four) hours as needed for Nausea.  Qty: 10 tablet, Refills: 0

## 2024-04-08 NOTE — ED INITIAL ASSESSMENT (HPI)
C/o last week feeling dizzy and off balance-worse with movements and bending that went away. This morning states equilibrium/balance  is off again. Sinus congestion started this morning. Scheduled to have dental implants tomorrow.

## 2024-04-08 NOTE — DISCHARGE INSTRUCTIONS
Follow up with your primary care doctor  Take meclizine 25 mg three times a day as needed for dizzinesss  Take zofran as needed for nausea every 4 hours  Return if any worsening symptoms or new concern

## 2024-07-15 ENCOUNTER — TELEPHONE (OUTPATIENT)
Dept: FAMILY MEDICINE CLINIC | Facility: CLINIC | Age: 79
End: 2024-07-15

## 2024-07-15 DIAGNOSIS — R73.9 HYPERGLYCEMIA: Primary | ICD-10-CM

## 2024-07-15 DIAGNOSIS — Z13.6 SCREENING FOR CARDIOVASCULAR CONDITION: ICD-10-CM

## 2024-07-15 DIAGNOSIS — Z12.5 SCREENING FOR MALIGNANT NEOPLASM OF PROSTATE: ICD-10-CM

## 2024-07-15 NOTE — TELEPHONE ENCOUNTER
Please enter lab orders for the patient's upcoming physical appointment.     Physical scheduled:   Your appointments       Date & Time Appointment Department ([place])    Aug 12, 2024 12:00 PM CDT Medicare Annual Well Visit with Jayro Abernathy MD [facility] ([facility])              [facility]  [address] [place] [address] [place] [id number]  [phone]           Preferred lab: [facility] ([facility])     The patient has been notified to complete fasting labs prior to their physical appointment.

## 2024-08-06 ENCOUNTER — LAB ENCOUNTER (OUTPATIENT)
Dept: LAB | Age: 79
End: 2024-08-06
Attending: FAMILY MEDICINE
Payer: MEDICARE

## 2024-08-06 DIAGNOSIS — Z12.5 SCREENING FOR MALIGNANT NEOPLASM OF PROSTATE: ICD-10-CM

## 2024-08-06 DIAGNOSIS — R73.9 HYPERGLYCEMIA: ICD-10-CM

## 2024-08-06 DIAGNOSIS — Z13.6 SCREENING FOR CARDIOVASCULAR CONDITION: ICD-10-CM

## 2024-08-06 LAB
ALBUMIN SERPL-MCNC: 4.7 G/DL (ref 3.2–4.8)
ALBUMIN/GLOB SERPL: 2.1 {RATIO} (ref 1–2)
ALP LIVER SERPL-CCNC: 66 U/L
ALT SERPL-CCNC: 18 U/L
ANION GAP SERPL CALC-SCNC: 8 MMOL/L (ref 0–18)
AST SERPL-CCNC: 24 U/L (ref ?–34)
BILIRUB SERPL-MCNC: 0.9 MG/DL (ref 0.2–1.1)
BUN BLD-MCNC: 14 MG/DL (ref 9–23)
CALCIUM BLD-MCNC: 9.9 MG/DL (ref 8.7–10.4)
CHLORIDE SERPL-SCNC: 105 MMOL/L (ref 98–112)
CHOLEST SERPL-MCNC: 207 MG/DL (ref ?–200)
CO2 SERPL-SCNC: 25 MMOL/L (ref 21–32)
COMPLEXED PSA SERPL-MCNC: 2.3 NG/ML (ref ?–4)
CREAT BLD-MCNC: 1.04 MG/DL
EGFRCR SERPLBLD CKD-EPI 2021: 74 ML/MIN/1.73M2 (ref 60–?)
EST. AVERAGE GLUCOSE BLD GHB EST-MCNC: 126 MG/DL (ref 68–126)
FASTING PATIENT LIPID ANSWER: YES
FASTING STATUS PATIENT QL REPORTED: YES
GLOBULIN PLAS-MCNC: 2.2 G/DL (ref 2–3.5)
GLUCOSE BLD-MCNC: 111 MG/DL (ref 70–99)
HBA1C MFR BLD: 6 % (ref ?–5.7)
HDLC SERPL-MCNC: 44 MG/DL (ref 40–59)
LDLC SERPL CALC-MCNC: 143 MG/DL (ref ?–100)
NONHDLC SERPL-MCNC: 163 MG/DL (ref ?–130)
OSMOLALITY SERPL CALC.SUM OF ELEC: 287 MOSM/KG (ref 275–295)
POTASSIUM SERPL-SCNC: 4.4 MMOL/L (ref 3.5–5.1)
PROT SERPL-MCNC: 6.9 G/DL (ref 5.7–8.2)
SODIUM SERPL-SCNC: 138 MMOL/L (ref 136–145)
TRIGL SERPL-MCNC: 112 MG/DL (ref 30–149)
VLDLC SERPL CALC-MCNC: 21 MG/DL (ref 0–30)

## 2024-08-06 PROCEDURE — 80061 LIPID PANEL: CPT

## 2024-08-06 PROCEDURE — 36415 COLL VENOUS BLD VENIPUNCTURE: CPT

## 2024-08-06 PROCEDURE — 80053 COMPREHEN METABOLIC PANEL: CPT

## 2024-08-06 PROCEDURE — 83036 HEMOGLOBIN GLYCOSYLATED A1C: CPT

## 2024-08-12 ENCOUNTER — OFFICE VISIT (OUTPATIENT)
Dept: FAMILY MEDICINE CLINIC | Facility: CLINIC | Age: 79
End: 2024-08-12
Payer: MEDICARE

## 2024-08-12 VITALS
SYSTOLIC BLOOD PRESSURE: 120 MMHG | WEIGHT: 180 LBS | BODY MASS INDEX: 25.48 KG/M2 | RESPIRATION RATE: 16 BRPM | HEIGHT: 70.5 IN | OXYGEN SATURATION: 97 % | HEART RATE: 75 BPM | DIASTOLIC BLOOD PRESSURE: 70 MMHG

## 2024-08-12 DIAGNOSIS — K86.89 DILATION OF PANCREATIC DUCT (HCC): ICD-10-CM

## 2024-08-12 DIAGNOSIS — R73.9 HYPERGLYCEMIA: ICD-10-CM

## 2024-08-12 DIAGNOSIS — D49.0 IPMN (INTRADUCTAL PAPILLARY MUCINOUS NEOPLASM): ICD-10-CM

## 2024-08-12 DIAGNOSIS — Z00.00 ENCOUNTER FOR ANNUAL HEALTH EXAMINATION: Primary | ICD-10-CM

## 2024-08-12 NOTE — PROGRESS NOTES
Subjective:   Dusty Garcia Jr. is a 78 year old male who presents for a Subsequent Annual Wellness visit (Pt already had Initial Annual Wellness) and scheduled follow up of multiple significant but stable problems.   Preventative Screening  Colonoscopy - 12/2021.  No repeat needed   Prostate - 2.3.    Immunizations - all UTD.     Sugars - A1C 6.0  slow trend up.  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  Lost several in just the past week.  Less active in general as a result.   Mid March, started having some ED problems.  Can be intimate, but harder time maintaining the erection.       HLD - stable.  LDL now 143.  The above explains the lipids too.      Cyst on pancreas - follows with [facility].  Dilated pancreatic duct.  Was tentatively planning surgery, but looked better on repeat imaging.  Next planned for end November.     History/Other:   Fall Risk Assessment:   He has been screened for Falls and is low risk.      Cognitive Assessment:   He had a completely normal cognitive assessment - see flowsheet entries     Functional Ability/Status:   Dusty Garcia Jr. has some abnormal functions as listed below:  He has Vision problems based on screening of functional status.       Depression Screening (PHQ):  PHQ-2 SCORE: 0  , done 8/12/2024   Trouble falling or staying asleep, or sleeping too much: 1     If you checked off any problems, how difficult have these problems made it for you to do your work, take care of things at home, or get along with other people?: Not difficult at all              Advanced Directives:   He does NOT have a Living Will. [Do you have a living will?: No]  He does have a POA but we do NOT have it on file in Epic.    Discussed Advance Care Planning with patient (and family/surrogate if present). Standard forms made available to patient in After Visit Summary.      Patient Active Problem List   Diagnosis    Reflux esophagitis    H/O  colonoscopy with polypectomy    s/p R hip replacement    IPMN (intraductal papillary mucinous neoplasm)    History of diverticulitis    h/o L knee TKA    Combined forms of age-related cataract, bilateral    Myopia of both eyes with astigmatism    S/P colon resection    Hyperglycemia    Benign prostatic hyperplasia without lower urinary tract symptoms    Primary osteoarthritis of right knee    Dilation of pancreatic duct (HCC)     Allergies:  He is allergic to levofloxacin, sulfa antibiotics, norco [hydrocodone-acetaminophen], and other.    Current Medications:  Outpatient Medications Marked as Taking for the 8/12/24 encounter (Office Visit) with Jayro Abernathy MD   Medication Sig    tamsulosin 0.4 MG Oral Cap Take 1 capsule (0.4 mg total) by mouth daily.    Multiple Vitamin (MULTI-VITAMIN) Oral Tab Take 1 tablet by mouth daily.    psyllium 28 % Oral Powd Pack Take 1 packet by mouth 2 (two) times daily.       Medical History:  He  has a past medical history of Cataract, Disorders of bursae and tendons in shoulder region, unspecified, Diverticulosis of colon (06/29/2012), Lumbar herniated disc, Osteoarthritis, Pancreatic abnormality, Secondary cataract of right eye, Sleep disturbance, unspecified (06/29/2012), and Visual impairment.  Surgical History:  He  has a past surgical history that includes colonoscopy (2006); appendectomy (1969); oral surgery procedure (3/2000); cortisone injection (2/1/2017); other (Right); upper gi endoscopy,exam; hip replacement surgery (Right, 02/10/2015); knee replacement surgery (Left, 2018); anesth,biceps tendon repair; carpal tunnel release; cataract; tonsillectomy; adenoidectomy; other; other surgical history (01/08/2020); and other (Left).   Family History:  His family history includes Parkinson's disease in his mother; anxiety disorder in his mother; atrial septal defect in his brother; cardiac devices pacemaker present in his father.  Social History:  He  reports that he has  never smoked. He has never used smokeless tobacco. He reports current alcohol use. He reports that he does not use drugs.    Tobacco:  He has never smoked tobacco.    CAGE Alcohol Screen:   He has been screened for alcohol abuse and his score is not 0:  Cut: Have you ever felt you should Cut down on your drinking?: Yes  Annoyed: Have people Annoyed you by criticizing your drinking?: Yes  Guilty: Have you ever felt bad or Guilty about your drinking?: Yes  Eye Opener: Have you ever had a drink first thing in the morning to steady your nerves or to get rid of a hangover (Eye opener)?: Yes  Total Score: 4      Patient Care Team:  Jayro Aberanthy MD as PCP - General (Family Medicine)  Brian Chau MD (GASTROENTEROLOGY)  Yahir Gaines MD (GASTROENTEROLOGY)  Edmundo Sher MD (OPHTHALMOLOGY)    Review of Systems  Constitutional: negative  Eyes: negative  Ears, nose, mouth, throat, and face: negative  Respiratory: negative  Cardiovascular: negative  Gastrointestinal: negative  Genitourinary: negative  Neurological: negative      Objective:   Physical Exam    General Appearance:  Alert, cooperative, no distress, appears stated age   Head:  Normocephalic, without obvious abnormality, atraumatic   Eyes:  PERRL, conjunctiva/corneas clear, EOM's intact   Neck: Supple, symmetrical, trachea midline, no adenopathy   Back:   Symmetric, no curvature, ROM normal, no CVA tenderness   Lungs:   Clear to auscultation bilaterally, respirations unlabored   Chest Wall:  No tenderness or deformity   Heart:  Regular rate and rhythm, S1, S2 normal, no murmur, rub or gallop   Abdomen:   Soft, non-tender, bowel sounds active all four quadrants,  no masses, no organomegaly   Extremities: Extremities normal, atraumatic, no cyanosis or edema   Pulses: 2+ and symmetric   Skin: Skin color, texture, turgor normal, no rashes or lesions   Neurologic: Normal      /70   Pulse 75   Resp 16   Ht 5' 10.5\" (1.791 m)   Wt 180 lb (81.6  kg)   SpO2 97%   BMI 25.46 kg/m²  Estimated body mass index is 25.46 kg/m² as calculated from the following:    Height as of this encounter: 5' 10.5\" (1.791 m).    Weight as of this encounter: 180 lb (81.6 kg).    Medicare Hearing Assessment:   Hearing Screening    Time taken: 8/12/2024 11:54 AM  Screening Method: Whisper Test  Whisper Test Result: Pass         Visual Acuity:   Right Eye Visual Acuity: Uncorrected Right Eye Chart Acuity: 20/20   Left Eye Visual Acuity: Uncorrected Left Eye Chart Acuity: 20/25   Both Eyes Visual Acuity: Uncorrected Both Eyes Chart Acuity: 20/20   Able To Tolerate Visual Acuity: Yes        Assessment & Plan:     Dusty Garcia Jr. was seen in the office today:  had concerns including Health Maintenance (MAW).    1. Encounter for annual health examination  Overall well  Working on getting back to healthy diet and exercise  C-scope complete  Immunization UTD  PSA normal.     2. Dilation of pancreatic duct (HCC)  3. IPMN (intraductal papillary mucinous neoplasm)  Following with GI team  Serial imaging  Better on the most recent scan.  No imminent surgery.  Plan to re-image in the end of the year.     4. Hyperglycemia  Up a bit from baseline  Has been off his usual routine.    Planning to eat better, be more active  Will monitor.  ED likely from this as well.  If not better with lifestyle changes, can consider Viagra RX            The patient indicates understanding of these issues and agrees to the plan.  Reinforced healthy diet, lifestyle, and exercise.      No follow-ups on file.     Jayro Abernathy MD, 8/12/2024     Supplementary Documentation:   General Health:  In the past six months, have you lost more than 10 pounds without trying?: 2 - No  Has your appetite been poor?: No  Type of Diet: Balanced  How does the patient maintain a good energy level?: Daily Walks;Stretching  How would you describe your daily physical activity?: Moderate  How would you describe your current  health state?: Good  How do you maintain positive mental well-being?: Social Interaction;Puzzles;Visiting Friends;Visiting Family  On a scale of 0 to 10, with 0 being no pain and 10 being severe pain, what is your pain level?: 0 - (None)  In the past six months, have you experienced urine leakage?: 0-No  At any time do you feel concerned for the safety/well-being of yourself and/or your children, in your home or elsewhere?: No  Have you had any immunizations at another office such as Influenza, Hepatitis B, Tetanus, or Pneumococcal?: Yes    Health Maintenance   Topic Date Due    COVID-19 Vaccine (7 - 2023-24 season) 01/26/2024    Annual Physical  06/27/2024    Influenza Vaccine (1) 10/01/2024    Annual Depression Screening  Completed    Fall Risk Screening (Annual)  Completed    Pneumococcal Vaccine: 65+ Years  Completed    Zoster Vaccines  Completed    Colorectal Cancer Screening  Discontinued

## 2024-12-03 RX ORDER — TAMSULOSIN HYDROCHLORIDE 0.4 MG/1
0.4 CAPSULE ORAL DAILY
Qty: 90 CAPSULE | Refills: 3 | Status: SHIPPED | OUTPATIENT
Start: 2024-12-03

## 2024-12-03 NOTE — TELEPHONE ENCOUNTER
Requested Prescriptions     Pending Prescriptions Disp Refills    tamsulosin 0.4 MG Oral Cap 90 capsule 3     Sig: Take 1 capsule (0.4 mg total) by mouth daily.     LOV 8/12/2024     Patient was asked to follow-up in: Follow-up not documented in note    Appointment scheduled: Visit date not found     Medication refilled per protocol.

## 2025-03-12 ENCOUNTER — TELEPHONE (OUTPATIENT)
Dept: FAMILY MEDICINE CLINIC | Facility: CLINIC | Age: 80
End: 2025-03-12

## 2025-03-12 DIAGNOSIS — Z01.818 PRE-OP TESTING: Primary | ICD-10-CM

## 2025-03-12 NOTE — TELEPHONE ENCOUNTER
Dr. Martínez   [phone]  Shoulder Replacement   EKG needed    Awaiting paperwork to be faxed over     Pre-op Appointment scheduled 3/18 with Dr. Abernathy

## 2025-03-13 NOTE — TELEPHONE ENCOUNTER
Surgery is Right reverse total shoulder arthroplasty scheduled for 3/27/25 at [facility] to be complete within 30 days prior to surgery,   Fax complete labs and OV 3 days prior to surgery to [phone]    Paperwork requesting   CBC  CMP  EKG

## 2025-03-13 NOTE — TELEPHONE ENCOUNTER
Pt has been rescheduled to 3/20/25 with dr. Abernathy at 10:30 am. Paperwork indicating clearance has to be within 7 days of surgery.

## 2025-03-13 NOTE — TELEPHONE ENCOUNTER
Spoke with patient, he states his surgeon's office put in orders for lab work and EKG and he had them done at their office. Patient will bring results in to appointment with him as well as try to upload to [employer].

## 2025-03-13 NOTE — TELEPHONE ENCOUNTER
Triage please call patient and advise lab orders place and need to be completed at least 24 hours prior to pre-op appt on 3/18/25 at an [facility], 4 hour fasting required.   EKG will be performed at appt.       Paperwork given to front staff

## 2025-03-13 NOTE — TELEPHONE ENCOUNTER
Dr. Martínez   [phone]  Shoulder Replacement   EKG needed    Awaiting paperwork to be faxed over     Pre-op Appointment scheduled 3/18 with Dr. Abernathy     EKG and blood work done at Dr. Martínez's office no need for any orders.  Only Medical Clearance and H&P.      Received pre op paperwork, placed in Pat,RN 's in box for review.

## 2025-03-14 NOTE — TELEPHONE ENCOUNTER
Received pre op paperwork with labs and EKG results attached.  Paperwork in pre op tickler awaiting appointment 3/20/25 at 10:30 am.

## 2025-03-17 ENCOUNTER — LAB ENCOUNTER (OUTPATIENT)
Dept: LAB | Age: 80
End: 2025-03-17
Attending: FAMILY MEDICINE
Payer: MEDICARE

## 2025-03-17 DIAGNOSIS — Z01.818 PRE-OP TESTING: ICD-10-CM

## 2025-03-17 LAB
ALBUMIN SERPL-MCNC: 4.8 G/DL (ref 3.2–4.8)
ALBUMIN/GLOB SERPL: 2.3 {RATIO} (ref 1–2)
ALP LIVER SERPL-CCNC: 69 U/L
ALT SERPL-CCNC: 22 U/L
ANION GAP SERPL CALC-SCNC: 9 MMOL/L (ref 0–18)
AST SERPL-CCNC: 30 U/L (ref ?–34)
BASOPHILS # BLD AUTO: 0.05 X10(3) UL (ref 0–0.2)
BASOPHILS NFR BLD AUTO: 0.8 %
BILIRUB SERPL-MCNC: 1.1 MG/DL (ref 0.2–1.1)
BUN BLD-MCNC: 14 MG/DL (ref 9–23)
CALCIUM BLD-MCNC: 10.3 MG/DL (ref 8.7–10.6)
CHLORIDE SERPL-SCNC: 104 MMOL/L (ref 98–112)
CO2 SERPL-SCNC: 29 MMOL/L (ref 21–32)
CREAT BLD-MCNC: 0.99 MG/DL
EGFRCR SERPLBLD CKD-EPI 2021: 78 ML/MIN/1.73M2 (ref 60–?)
EOSINOPHIL # BLD AUTO: 0.12 X10(3) UL (ref 0–0.7)
EOSINOPHIL NFR BLD AUTO: 2 %
ERYTHROCYTE [DISTWIDTH] IN BLOOD BY AUTOMATED COUNT: 13.1 %
FASTING STATUS PATIENT QL REPORTED: YES
GLOBULIN PLAS-MCNC: 2.1 G/DL (ref 2–3.5)
GLUCOSE BLD-MCNC: 108 MG/DL (ref 70–99)
HCT VFR BLD AUTO: 48 %
HGB BLD-MCNC: 15.9 G/DL
IMM GRANULOCYTES # BLD AUTO: 0.02 X10(3) UL (ref 0–1)
IMM GRANULOCYTES NFR BLD: 0.3 %
LYMPHOCYTES # BLD AUTO: 1.6 X10(3) UL (ref 1–4)
LYMPHOCYTES NFR BLD AUTO: 26 %
MCH RBC QN AUTO: 32.1 PG (ref 26–34)
MCHC RBC AUTO-ENTMCNC: 33.1 G/DL (ref 31–37)
MCV RBC AUTO: 96.8 FL
MONOCYTES # BLD AUTO: 0.64 X10(3) UL (ref 0.1–1)
MONOCYTES NFR BLD AUTO: 10.4 %
NEUTROPHILS # BLD AUTO: 3.72 X10 (3) UL (ref 1.5–7.7)
NEUTROPHILS # BLD AUTO: 3.72 X10(3) UL (ref 1.5–7.7)
NEUTROPHILS NFR BLD AUTO: 60.5 %
OSMOLALITY SERPL CALC.SUM OF ELEC: 295 MOSM/KG (ref 275–295)
PLATELET # BLD AUTO: 223 10(3)UL (ref 150–450)
POTASSIUM SERPL-SCNC: 4.4 MMOL/L (ref 3.5–5.1)
PROT SERPL-MCNC: 6.9 G/DL (ref 5.7–8.2)
RBC # BLD AUTO: 4.96 X10(6)UL
SODIUM SERPL-SCNC: 142 MMOL/L (ref 136–145)
WBC # BLD AUTO: 6.2 X10(3) UL (ref 4–11)

## 2025-03-17 PROCEDURE — 85025 COMPLETE CBC W/AUTO DIFF WBC: CPT

## 2025-03-17 PROCEDURE — 80053 COMPREHEN METABOLIC PANEL: CPT

## 2025-03-17 PROCEDURE — 36415 COLL VENOUS BLD VENIPUNCTURE: CPT

## 2025-03-20 ENCOUNTER — OFFICE VISIT (OUTPATIENT)
Dept: FAMILY MEDICINE CLINIC | Facility: CLINIC | Age: 80
End: 2025-03-20
Payer: MEDICARE

## 2025-03-20 VITALS
WEIGHT: 181 LBS | SYSTOLIC BLOOD PRESSURE: 100 MMHG | HEART RATE: 71 BPM | BODY MASS INDEX: 25.62 KG/M2 | HEIGHT: 70.5 IN | DIASTOLIC BLOOD PRESSURE: 70 MMHG | RESPIRATION RATE: 16 BRPM | OXYGEN SATURATION: 93 %

## 2025-03-20 DIAGNOSIS — M19.011 ARTHRITIS OF RIGHT SHOULDER REGION: ICD-10-CM

## 2025-03-20 DIAGNOSIS — Z01.818 PREOP EXAMINATION: Primary | ICD-10-CM

## 2025-03-20 DIAGNOSIS — K86.89 DILATION OF PANCREATIC DUCT (HCC): ICD-10-CM

## 2025-03-20 DIAGNOSIS — R73.9 HYPERGLYCEMIA: ICD-10-CM

## 2025-03-20 PROCEDURE — 99214 OFFICE O/P EST MOD 30 MIN: CPT | Performed by: FAMILY MEDICINE

## 2025-03-20 RX ORDER — SODIUM, POTASSIUM,MAG SULFATES 17.5-3.13G
1 SOLUTION, RECONSTITUTED, ORAL ORAL
COMMUNITY
Start: 2024-12-24 | End: 2025-03-20 | Stop reason: ALTCHOICE

## 2025-03-20 RX ORDER — MULTIVITAMIN
1 TABLET ORAL DAILY
COMMUNITY

## 2025-03-20 NOTE — PROGRESS NOTES
Chief Complaint   Patient presents with    Pre-Op     Patient here for pre op  3/27/25 Ashwin Martínez  Right reverse total shoulder arthroplasty, interscalene block     HPI:   Dusty Garcia Jr. is a 79 year old male who is being evaluated for pre-surgical clearance at the request of Dr. Martínez.   Surgery: Right reverse total shoulder arthroplasty  Surgeon: Ashwin Martínez  Date of Surgery: March 27, 2025 at [facility] surgery [place]  Previous Anesthesia: tolerates well. One time     Past medical history is notable for hyperglycemia, arthralgia status post hip and knee replacement.  Hyperglycermia - A1c in the prediabetic range, but eating healthy, exercising.  No active issues at present     Patient Active Problem List   Diagnosis    Reflux esophagitis    H/O colonoscopy with polypectomy    s/p R hip replacement    IPMN (intraductal papillary mucinous neoplasm)    History of diverticulitis    h/o L knee TKA    Combined forms of age-related cataract, bilateral    Myopia of both eyes with astigmatism    S/P colon resection    Hyperglycemia    Benign prostatic hyperplasia without lower urinary tract symptoms    Primary osteoarthritis of right knee    Dilation of pancreatic duct (HCC)       Medications Ordered Prior to Encounter[1]    Allergies:  Allergies[2]    Past Surgical History:   Procedure Laterality Date    Adenoidectomy      Anesth,biceps tendon repair      Appendectomy  1969    Carpal tunnel release      Cataract      Colonoscopy  2006    prev.2002    Cortisone injection  2/1/2017    both shoulder    Hip replacement surgery Right 02/10/2015    Knee replacement surgery Left 2018    Oral surgery procedure  3/2000    Other Right     BICEP TENDON REPAIR    Other      orthovisc injections in right knee    Other Left     rotator cuff repair    Other surgical history  01/08/2020    XI ROBOT ASSISTED LOW ANTERIOR COLON RESECTION, RIGID PROCTOSCOPY mobilization of the splenic flexure    Tonsillectomy       Upper gi endoscopy,exam         Family History   Problem Relation Age of Onset    Other (anxiety disorder [Other]) Mother     Other (Parkinson's disease [Other]) Mother     Other (atrial septal defect [Other]) Brother     Other (cardiac devices pacemaker present [Other]) Father        Social History     Tobacco Use    Smoking status: Never    Smokeless tobacco: Never   Substance Use Topics    Alcohol use: Yes     Comment: 1/2 glass of wine 4 days a week         REVIEW OF SYSTEMS:   Constitutional: negative  Eyes: negative  Ears, nose, mouth, throat, and face: negative  Respiratory: negative  Cardiovascular: negative  Gastrointestinal: negative  Genitourinary: negative  Neurological: negative     EXAM:   /70   Pulse 71   Resp 16   Ht 5' 10.5\" (1.791 m)   Wt 181 lb (82.1 kg)   SpO2 93%   BMI 25.60 kg/m²      General Appearance:  Alert, cooperative, no distress, appears stated age   Head:  Normocephalic, without obvious abnormality, atraumatic   Eyes:  PERRL, conjunctiva/corneas clear, EOM's intact   Neck: Supple, symmetrical, trachea midline, no adenopathy   Back:   Symmetric, no curvature, ROM normal, no CVA tenderness   Lungs:   Clear to auscultation bilaterally, respirations unlabored   Chest Wall:  No tenderness or deformity   Heart:  Regular rate and rhythm, S1, S2 normal, no murmur, rub or gallop   Abdomen:   Soft, non-tender, bowel sounds active all four quadrants,  no masses, no organomegaly   Extremities: Extremities normal, atraumatic, no cyanosis or edema   Pulses: 2+ and symmetric   Skin: Skin color, texture, turgor normal, no rashes or lesions   Neurologic: Normal        Component      Latest Ref [facility] 3/17/2025   WBC      4.0 - 11.0 x10(3) uL 6.2    RBC      3.80 - 5.80 x10(6)uL 4.96    Hemoglobin      13.0 - 17.5 g/dL 15.9    Hematocrit      39.0 - 53.0 % 48.0    Platelet Count      150.0 - 450.0 10(3)uL 223.0    MCV      80.0 - 100.0 fL 96.8    MCH      26.0 - 34.0 pg 32.1    MCHC      31.0 -  37.0 g/dL 33.1    RDW      % 13.1    Prelim Neutrophil Abs      1.50 - 7.70 x10 (3) uL 3.72    Neutrophils Absolute      1.50 - 7.70 x10(3) uL 3.72    Lymphocytes Absolute      1.00 - 4.00 x10(3) uL 1.60    Monocytes Absolute      0.10 - 1.00 x10(3) uL 0.64    Eosinophils Absolute      0.00 - 0.70 x10(3) uL 0.12    Basophils Absolute      0.00 - 0.20 x10(3) uL 0.05    Immature Granulocyte Absolute      0.00 - 1.00 x10(3) uL 0.02    Neutrophils %      % 60.5    Lymphocytes %      % 26.0    Monocytes %      % 10.4    Eosinophils %      % 2.0    Basophils %      % 0.8    Immature Granulocyte %      % 0.3    Glucose      70 - 99 mg/dL 108 (H)    Sodium      136 - 145 mmol/L 142    Potassium      3.5 - 5.1 mmol/L 4.4    Chloride      98 - 112 mmol/L 104    Carbon Dioxide, Total      21.0 - 32.0 mmol/L 29.0    ANION GAP      0 - 18 mmol/L 9    BUN      9 - 23 mg/dL 14    CREATININE      0.70 - 1.30 mg/dL 0.99    CALCIUM      8.7 - 10.6 mg/dL 10.3    CALCULATED OSMOLALITY      275 - 295 mOsm/kg 295    EGFR      >=60 mL/min/1.73m2 78    AST (SGOT)      <34 U/L 30    ALT (SGPT)      10 - 49 U/L 22    ALKALINE PHOSPHATASE      45 - 117 U/L 69    Total Bilirubin      0.2 - 1.1 mg/dL 1.1    PROTEIN, TOTAL      5.7 - 8.2 g/dL 6.9    Albumin      3.2 - 4.8 g/dL 4.8    Globulin      2.0 - 3.5 g/dL 2.1    A/G Ratio      1.0 - 2.0  2.3 (H)    Patient Fasting for CMP? Yes        EKG at  3/13/2025:  Sinus bradycardia  Incomplete right bundle branch block  Borderline ECG  When compared with ECG of 20-JAN-2015 13:14,  No significant change was found  Confirmed by Seven Salazar ([id number]) on 3/13/2025 10:32:23 AM    Electrocardiogram (ECG) (03/13/2025 10:01 AM CDT)  ECG Results - Electrocardiogram (ECG) (03/13/2025 10:01 AM CDT)  Component Value Ref Range Test Method Analysis Time Performed At Pathologist Signature   Ventricular Rate 58 BPM     MUSE     Atrial Rate 58 BPM     MUSE     P-R Interval 208 ms     MUSE     QRS Duration 106 ms      MUSE      ms     MUSE     QTc 441 ms     MUSE     P [place] 36 degrees     MUSE     R [place] -28 degrees     MUSE     T [place] 7 degrees     MUSE      ASSESSMENT AND PLAN:     Dusty Garcia Jr. was seen in the office today:  had concerns including Pre-Op (Patient here for pre op/3/27/25 Ashwin Martínez/Right reverse total shoulder arthroplasty, interscalene block).    1. Preop examination  Medically he is a good candidate for the planned surgery  All chronic medical conditions are currently controlled  Preop CBC, CMP, EKG are stable  He is aware to hold all supplements and NSAIDs a week prior to surgery  He is medically cleared    2. Arthritis of right shoulder region  Planning surgical reconstruction    3. Hyperglycemia  Remains in the prediabetes range, fasting sugar at 108  This should have no impact on the surgery or healing    4. Dilation of pancreatic duct (HCC)  Noted, monitoring with specialist  Reassuring biopsy recently.  Will readdress this in summer with imaging and specialists      Will forward preop clearance to Dr. Martínez and his surgical team      Jayro Abernathy M.D.   EMG 3  03/20/25                   [1]   Current Outpatient Medications on File Prior to Visit   Medication Sig Dispense Refill    Multiple Vitamin (MULTI-VITAMIN) Oral Tab Take 1 tablet by mouth daily.      tamsulosin 0.4 MG Oral Cap Take 1 capsule (0.4 mg total) by mouth daily. 90 capsule 3     No current facility-administered medications on file prior to visit.   [2]   Allergies  Allergen Reactions    Levofloxacin OTHER (SEE COMMENTS)     Multiple tendon ruptures - side effect    Sulfa Antibiotics NAUSEA AND VOMITING, OTHER (SEE COMMENTS) and ITCHING     Contact dermitis from SCD's- Blisters, oozing blisters    Norco [Hydrocodone-Acetaminophen] NAUSEA AND VOMITING    Other RASH     Contact dermitis from SCD's

## (undated) DEVICE — LAP COLON CDS: Brand: MEDLINE INDUSTRIES, INC.

## (undated) DEVICE — KENDALL SCD EXPRESS SLEEVES, KNEE LENGTH, MEDIUM: Brand: KENDALL SCD

## (undated) DEVICE — LAPAROSCOPIC ACCESS SYSTEM: Brand: ALEXIS LAPAROSCOPIC SYSTEM WITH KII FIOS FIRST ENTRY

## (undated) DEVICE — LAPAROTOMY SPONGE - RF AND X-RAY DETECTABLE PRE-WASHED: Brand: SITUATE

## (undated) DEVICE — 1200CC GUARDIAN II: Brand: GUARDIAN

## (undated) DEVICE — ENDOSCOPY PACK UPPER: Brand: MEDLINE INDUSTRIES, INC.

## (undated) DEVICE — FILTERLINE NASAL ADULT O2/CO2

## (undated) DEVICE — STAPLER SHEATH: Brand: ENDOWRIST

## (undated) DEVICE — 40580 - THE PINK PAD - ADVANCED TRENDELENBURG POSITIONING KIT: Brand: 40580 - THE PINK PAD - ADVANCED TRENDELENBURG POSITIONING KIT

## (undated) DEVICE — SUTURE VLOC 90 3-0 9\" 0644

## (undated) DEVICE — SUTURE PROLENE 2-0 SH

## (undated) DEVICE — SUTURE PDS II 0 CT-1

## (undated) DEVICE — VESSEL SEALER EXTEND: Brand: ENDOWRIST

## (undated) DEVICE — VIOLET BRAIDED (POLYGLACTIN 910), SYNTHETIC ABSORBABLE SUTURE: Brand: COATED VICRYL

## (undated) DEVICE — SIGMOIDOSCOPE LIGHTED BIOSEAL

## (undated) DEVICE — 3M™ RED DOT™ MONITORING ELECTRODE WITH FOAM TAPE AND STICKY GEL, 50/BAG, 20/CASE, 72/PLT 2570: Brand: RED DOT™

## (undated) DEVICE — STANDARD HYPODERMIC NEEDLE,POLYPROPYLENE HUB: Brand: MONOJECT

## (undated) DEVICE — MEDI-VAC NON-CONDUCTIVE SUCTION TUBING: Brand: CARDINAL HEALTH

## (undated) DEVICE — VISUALIZATION SYSTEM: Brand: CLEARIFY

## (undated) DEVICE — SUTURE VICRYL 0

## (undated) DEVICE — CANNULA SEAL

## (undated) DEVICE — SUTURE SILK 3-0 SH

## (undated) DEVICE — SUTURE MONOCRYL 4-0 PS-2

## (undated) DEVICE — SYRINGE 20CC LL TIP

## (undated) DEVICE — GAMMEX® NON-LATEX PI ORTHO SIZE 8, STERILE POLYISOPRENE POWDER-FREE SURGICAL GLOVE: Brand: GAMMEX

## (undated) DEVICE — GOWN,SIRUS,FABRIC-REINFORCED,X-LARGE: Brand: MEDLINE

## (undated) DEVICE — SEAL

## (undated) DEVICE — Device

## (undated) DEVICE — COVER,MAYO STAND,STERILE: Brand: MEDLINE

## (undated) DEVICE — TROCAR: Brand: KII FIOS FIRST ENTRY

## (undated) DEVICE — COLUMN DRAPE

## (undated) DEVICE — ARM DRAPE

## (undated) DEVICE — TOWEL OR BLU 16X26 STRL

## (undated) DEVICE — BLADELESS OBTURATOR: Brand: WECK VISTA

## (undated) DEVICE — ENDOSCOPY PACK - LOWER: Brand: MEDLINE INDUSTRIES, INC.

## (undated) DEVICE — BETADINE SOLUTION 8 OZ BTL

## (undated) DEVICE — DERMABOND LIQUID ADHESIVE

## (undated) DEVICE — STAPLER 45 RELOAD BLUE: Brand: ENDOWRIST

## (undated) DEVICE — STAPLER CIRCULAR ENDO 29MM

## (undated) DEVICE — FLOTRAC SENSOR (84" / 213CM): Brand: FLOTRAC

## (undated) DEVICE — CLOSING BUNDLE: Brand: MEDLINE INDUSTRIES, INC.

## (undated) NOTE — MR AVS SNAPSHOT
[address] [facility] [address]  [facility],  [address]  [address] [place] [address] [phone]               Thank you for choosing us for your health care visit with Yolanda Huff MD.  We are glad to serve you and happy to provide you with this s ---------- Medicare covers annually or at 6-month intervals for prediabetic patients        Cardiovascular Disease Screening     Cholesterol, covered every 5 yrs including Total, LDL and Trigs LDL CHOLESTEROL (mg/dL)   Date Value   06/06/2017 134*     CHOL same date    Immunizations      Influenza  Covered Annually No orders found for this or any previous visit.  Please get every year    Pneumococcal 13 (Prevnar)  Covered Once after 65   Orders placed or performed in visit on 06/10/16  -PNEUMOCOCCAL VACC, 13 Allergies as of Jun 13, 2017     Norco [Hydrocodone-Acetaminophen] Nausea and vomiting                Today's Vital Signs     BP Pulse Temp Height Weight BMI    104/62 mmHg 80 97.6 °F (36.4 °C) 69.75\" 180 lb 26.00 kg/m2         Current Medications Eat plenty of protein, keep the fat content low Sugars:  sodas and sports drinks, candies and desserts   Eat plenty of low-fat dairy products High fat meats and dairy   Choose whole grain products Foods high in sodium   Water is best for hydration Fast vinicio

## (undated) NOTE — ED AVS SNAPSHOT
Paola García   MRN: [id number]    Department:  [facility] Emergency Department   Date of Visit:  7/6/2018           Disclosure     Insurance plans vary and the physician(s) referred by the ER may not be covered by your plan.  Please contact tell this physician (or your personal doctor if your instructions are to return to your personal doctor) about any new or lasting problems. The primary care or specialist physician will see patients referred from the [facility] Emergency Department.  Soy Guevara

## (undated) NOTE — ED AVS SNAPSHOT
[facility] Emergency Department    [place]  [address] [address]    Phone:  [phone]    Fax:  [address]   MRN: [id number]    Department:  [facility] Emergency Department   Date of Visit: START taking these medications     levofloxacin 500 MG Tabs   Quantity:  10 tablet   Commonly known as:  LEVAQUIN   Take 1 tablet (500 mg total) by mouth daily.        metRONIDAZOLE 500 MG Tabs   Quantity:  20 tablet   Commonly known as:  FLAGYL   Take 1 ta a detailed feedback survey mailed to them a week after the visit. If you receive this, we would really appreciate it if you could take the time to complete it. Thank you! You were examined and treated today on an urgent basis only.   This was not a valencia [id number]  [facility] ([address]) [facility] Lucie Daysi Mariee. ([address]) [address] [employer] Joséwvladislav Lagunas (Milagro Pittman) [address] [facility] Keerthi Rendon & workstation to localize potential stones in the cranio-caudal plane. Dose reduction techniques were used.    Dose information is transmitted to the [facility] [employer] [facility]) NRDR ([address]) which includes the Dose Index Re discharge instructions in [employer] by going to Visits < Admission Summaries. If you've been to the Emergency Department or your doctor's office, you can view your past visit information in [employer] by going to Visits < Visit Summaries. KO-SU questions?

## (undated) NOTE — MR AVS SNAPSHOT
[address] [facility] [address]  [facility],  [address]  [facility] [address] [facility] [address]               Thank you for choosing us for your health care visit with Sarai Wheat MD.  We are glad to serve you and happy to provide you with this s MULTIVITAMIN OR   Take  by mouth. Omeprazole 40 MG Cpdr   Take 1 capsule (40 mg total) by mouth daily. What changed:  when to take this           tamsulosin HCl 0.4 MG Caps   Take 1 capsule (0.4 mg total) by mouth daily.    Commonly know

## (undated) NOTE — ED AVS SNAPSHOT
Kenan Jin. MRN: [id number]    Department:  [facility] Emergency Department   Date of Visit:  6/26/2019           Disclosure     Insurance plans vary and the physician(s) referred by the ER may not be covered by your plan.  Please co tell this physician (or your personal doctor if your instructions are to return to your personal doctor) about any new or lasting problems. The primary care or specialist physician will see patients referred from the [facility] Emergency Department.  Ann Barker

## (undated) NOTE — ED AVS SNAPSHOT
[facility] Emergency Department    [address] [address]    Phone:  [phone]    Fax:  [address]   MRN: [id number]    Department:  [facility] Emergency Department   Date of Visit: IF THERE IS ANY CHANGE OR WORSENING OF YOUR CONDITION, CALL YOUR PRIMARY CARE PHYSICIAN AT ONCE OR RETURN IMMEDIATELY TO THE EMERGENCY DEPARTMENT.     If you have been prescribed any medication(s), please fill your prescription right away and begin taking t

## (undated) NOTE — ED AVS SNAPSHOT
Lacey Ramires   MRN: [id number]    Department:  [facility] Emergency Department   Date of Visit:  5/19/2018           Disclosure     Insurance plans vary and the physician(s) referred by the ER may not be covered by your plan.  Please contac tell this physician (or your personal doctor if your instructions are to return to your personal doctor) about any new or lasting problems. The primary care or specialist physician will see patients referred from the [facility] Emergency Department.  Duarte Israel